# Patient Record
Sex: FEMALE | Race: BLACK OR AFRICAN AMERICAN | Employment: FULL TIME | URBAN - METROPOLITAN AREA
[De-identification: names, ages, dates, MRNs, and addresses within clinical notes are randomized per-mention and may not be internally consistent; named-entity substitution may affect disease eponyms.]

---

## 2017-01-14 RX ORDER — ATORVASTATIN CALCIUM 10 MG/1
TABLET, FILM COATED ORAL
Qty: 30 TAB | Refills: 1 | Status: SHIPPED | OUTPATIENT
Start: 2017-01-14 | End: 2017-04-18 | Stop reason: SDUPTHER

## 2017-01-14 NOTE — TELEPHONE ENCOUNTER
Please let pt know the rx they requested was sent to the pharmacy  And set up her follow up appt please I do not see one made

## 2017-01-16 ENCOUNTER — OFFICE VISIT (OUTPATIENT)
Dept: INTERNAL MEDICINE CLINIC | Age: 57
End: 2017-01-16

## 2017-01-16 VITALS
OXYGEN SATURATION: 92 % | TEMPERATURE: 100.4 F | RESPIRATION RATE: 14 BRPM | HEART RATE: 98 BPM | BODY MASS INDEX: 34.84 KG/M2 | DIASTOLIC BLOOD PRESSURE: 96 MMHG | SYSTOLIC BLOOD PRESSURE: 146 MMHG | WEIGHT: 216.8 LBS | HEIGHT: 66 IN

## 2017-01-16 DIAGNOSIS — R68.89 NOT FEELING GREAT: ICD-10-CM

## 2017-01-16 DIAGNOSIS — Z76.0 MEDICATION REFILL: ICD-10-CM

## 2017-01-16 DIAGNOSIS — R52 BODY ACHES: ICD-10-CM

## 2017-01-16 DIAGNOSIS — R05.8 NON-PRODUCTIVE COUGH: ICD-10-CM

## 2017-01-16 DIAGNOSIS — I10 ESSENTIAL HYPERTENSION: ICD-10-CM

## 2017-01-16 DIAGNOSIS — Z78.9 RECENT FOREIGN TRAVEL: ICD-10-CM

## 2017-01-16 DIAGNOSIS — R50.9 FEVER AND CHILLS: Primary | ICD-10-CM

## 2017-01-16 LAB
QUICKVUE INFLUENZA TEST: NEGATIVE
VALID INTERNAL CONTROL?: YES

## 2017-01-16 RX ORDER — OSELTAMIVIR PHOSPHATE 75 MG/1
75 CAPSULE ORAL 2 TIMES DAILY
Qty: 10 CAP | Refills: 0 | Status: SHIPPED | OUTPATIENT
Start: 2017-01-16 | End: 2017-01-21

## 2017-01-16 RX ORDER — ALBUTEROL SULFATE 0.83 MG/ML
2.5 SOLUTION RESPIRATORY (INHALATION) ONCE
Qty: 1 EACH | Refills: 0
Start: 2017-01-16 | End: 2017-01-16

## 2017-01-16 RX ORDER — ALBUTEROL SULFATE 90 UG/1
1 AEROSOL, METERED RESPIRATORY (INHALATION)
Qty: 1 INHALER | Refills: 3 | Status: SHIPPED | OUTPATIENT
Start: 2017-01-16 | End: 2019-04-23 | Stop reason: SDUPTHER

## 2017-01-16 RX ORDER — CODEINE PHOSPHATE AND GUAIFENESIN 10; 100 MG/5ML; MG/5ML
5 SOLUTION ORAL
Qty: 180 ML | Refills: 0 | Status: SHIPPED | OUTPATIENT
Start: 2017-01-16 | End: 2017-05-05 | Stop reason: ALTCHOICE

## 2017-01-16 RX ORDER — AZITHROMYCIN 250 MG/1
250 TABLET, FILM COATED ORAL SEE ADMIN INSTRUCTIONS
Qty: 6 TAB | Refills: 0 | Status: SHIPPED | OUTPATIENT
Start: 2017-01-16 | End: 2017-01-21

## 2017-01-16 RX ORDER — ALBUTEROL SULFATE 0.83 MG/ML
SOLUTION RESPIRATORY (INHALATION)
Qty: 1 PACKAGE | Refills: 0 | Status: SHIPPED | OUTPATIENT
Start: 2017-01-16 | End: 2019-04-23 | Stop reason: SDUPTHER

## 2017-01-16 NOTE — PROGRESS NOTES
1. Have you been to the ER, urgent care clinic since your last visit? Hospitalized since your last visit? No    2. Have you seen or consulted any other health care providers outside of the [address] [place] [address] since your last visit? Include any pap smears or colon screening. No    Chief Complaint   Patient presents with    Cold Symptoms     Has had a dry cough since yesterday, has headache, body aches, facial pain.       fasting

## 2017-01-16 NOTE — MR AVS SNAPSHOT
Visit Information Date & Time Provider Department Dept. Phone Encounter #  
 1/16/2017  8:30  [facility] [place] [address] [facility] [place] [id number] Follow-up Instructions Return in about 1 week (around 1/23/2017) for follow up influenza 15 min . Upcoming Health Maintenance Date Due Hepatitis C Screening 1960 BREAST CANCER SCRN MAMMOGRAM 4/16/2018 PAP AKA CERVICAL CYTOLOGY 9/16/2019 DTaP/Tdap/Td series (2 - Td) 12/21/2025 COLONOSCOPY 3/8/2026 Allergies as of 1/16/2017  Review Complete On: 1/16/2017 By: Tricia Melgar LPN Severity Noted Reaction Type Reactions Duragesic [Fentanyl]  06/07/2013    Nausea and Vomiting Current Immunizations  Reviewed on 11/16/2016 Name Date Tdap 12/21/2015 Not reviewed this visit You Were Diagnosed With   
  
 Codes Comments Fever and chills    -  Primary ICD-10-CM: R50.9 ICD-9-CM: 780.60 Body aches     ICD-10-CM: R52 ICD-9-CM: 780.96 Not feeling great     ICD-10-CM: R68.89 ICD-9-CM: 780.99 Recent foreign travel     ICD-10-CM: Z78.9 ICD-9-CM: V49.89 Essential hypertension     ICD-10-CM: I10 
ICD-9-CM: 401.9 Medication refill     ICD-10-CM: Z76.0 ICD-9-CM: V68.1 Non-productive cough     ICD-10-CM: R05 ICD-9-CM: 897. 2 Vitals BP Pulse Temp Resp Height(growth percentile) Weight(growth percentile) (!) 146/96 (BP 1 Location: Left arm, BP Patient Position: At rest) 98 100.4 °F (38 °C) (Oral) 14 5' 6\" (1.676 m) 216 lb 12.8 oz (98.3 kg) SpO2 PF BMI OB Status Smoking Status 92% 290 L/min 34.99 kg/m2 Hysterectomy Never Smoker Vitals History BMI and BSA Data Body Mass Index Body Surface Area 34.99 kg/m 2 2.14 m 2 Preferred Pharmacy Pharmacy Name Phone [id number] [place], [place] - [address] [phone] Your Updated Medication List  
  
   
This list is accurate as of: 1/16/17 10:07 AM.  Always use your most recent med list.  
  
  
  
  
 * albuterol 2.5 mg /3 mL (0.083 %) nebulizer solution Commonly known as:  PROVENTIL VENTOLIN  
1 vial Q 4-6 hrs as needed for cough * albuterol 90 mcg/actuation inhaler Commonly known as:  PROVENTIL HFA, VENTOLIN HFA, PROAIR HFA Take 1 Puff by inhalation every six (6) hours as needed for Wheezing or Shortness of Breath (cough). * albuterol 2.5 mg /3 mL (0.083 %) nebulizer solution Commonly known as:  PROVENTIL VENTOLIN  
3 mL by Nebulization route once for 1 dose. atorvastatin 10 mg tablet Commonly known as:  LIPITOR  
TAKE 1 TAB BY MOUTH DAILY. azithromycin 250 mg tablet Commonly known as:  Grainger Sleight Take 1 Tab by mouth See Admin Instructions for 5 days. cyclobenzaprine 5 mg tablet Commonly known as:  FLEXERIL Take 1 Tab by mouth nightly as needed for Muscle Spasm(s). diclofenac EC 75 mg EC tablet Commonly known as:  VOLTAREN Take  by mouth.  
  
 guaiFENesin-codeine 100-10 mg/5 mL solution Commonly known as:  ROBITUSSIN AC Take 5 mL by mouth four (4) times daily as needed for Cough. Max Daily Amount: 20 mL.  
  
 hydroxychloroquine 200 mg tablet Commonly known as:  PLAQUENIL Take 200 mg by mouth daily. ipratropium 0.06 % nasal spray Commonly known as:  ATROVENT  
2 Sprays by Both Nostrils route four (4) times daily. ketotifen 0.025 % (0.035 %) ophthalmic solution Commonly known as:  ZADITOR Administer 1 Drop to both eyes two (2) times a day. levothyroxine 112 mcg tablet Commonly known as:  SYNTHROID Take 1 Tab by mouth Daily (before breakfast). SYNTHROID brand name medically necessary  
  
 loratadine 10 mg tablet Commonly known as:  CLARITIN  
TAKE 1 TAB BY MOUTH DAILY. MOBIC PO Take  by mouth as needed. montelukast 10 mg tablet Commonly known as:  SINGULAIR  
 Take 1 Tab by mouth daily. oseltamivir 75 mg capsule Commonly known as:  TAMIFLU Take 1 Cap by mouth two (2) times a day for 5 days. Peak Flow Meter Yuridia Use as directed  
  
 triamterene-hydroCHLOROthiazide 37.5-25 mg per capsule Commonly known as:  Erika [place] Take 1 Cap by mouth daily. ZYRTEC PO Take  by mouth as needed. * Notice: This list has 3 medication(s) that are the same as other medications prescribed for you. Read the directions carefully, and ask your doctor or other care provider to review them with you. Prescriptions Printed Refills  
 guaiFENesin-codeine (ROBITUSSIN AC) 100-10 mg/5 mL solution 0 Sig: Take 5 mL by mouth four (4) times daily as needed for Cough. Max Daily Amount: 20 mL. Class: Print Route: Oral  
  
Prescriptions Sent to Pharmacy Refills  
 albuterol (PROVENTIL VENTOLIN) 2.5 mg /3 mL (0.083 %) nebulizer solution 0 Si vial Q 4-6 hrs as needed for cough Class: Normal  
 Pharmacy: [facility]/pharmacy [address] [place] [address] [address] Ph #: [phone]  
 albuterol (PROVENTIL HFA, VENTOLIN HFA, PROAIR HFA) 90 mcg/actuation inhaler 3 Sig: Take 1 Puff by inhalation every six (6) hours as needed for Wheezing or Shortness of Breath (cough). Class: Normal  
 Pharmacy: [facility]/pharmacy [address] [place] [address] [address] Ph #: [phone] Route: Inhalation  
 azithromycin (ZITHROMAX) 250 mg tablet 0 Sig: Take 1 Tab by mouth See Admin Instructions for 5 days. Class: Normal  
 Pharmacy: [facility]/pharmacy [address] [place] [address] [address] Ph #: [phone] Route: Oral  
 Peak Flow Meter negrita 0 Sig: Use as directed Class: Normal  
 Pharmacy: [facility]/pharmacy [address] [place] [address] [address]  [address] Ph #: [phone]  
 oseltamivir (TAMIFLU) 75 mg capsule 0  
 Sig: Take 1 Cap by mouth two (2) times a day for 5 days. Class: Normal  
 [facility] [id number] [address] [facility] [address]  #: [phone] Route: Oral  
  
We Performed the Following ALBUTEROL, INHAL. SOL., FDA-APPROVED FINAL, NON-COMPOUND UNIT DOSE, 1 MG [ HCPCS] AMB POC RAPID INFLUENZA TEST [44376 CPT(R)] B PERTUSSIS AB, IGG, IGM, IGA [id number] CPT(R)] CBC WITH AUTOMATED DIFF [98586 CPT(R)] INFLUENZA A, H1N1 BY PCR [[id number] Custom] LEGIONELLA PNEUMOPHILA AG, URINE  R577184 CPT(R)] Follow-up Instructions Return in about 1 week (around 1/23/2017) for follow up influenza 15 min . To-Do List   
 01/16/2017 Lab:  INFLUENZA A, H1N1 BY PCR   
  
 01/16/2017 Procedures:  INHAL RX, AIRWAY OBST/DX SPUTUM INDUCT Patient Instructions Please rest get plenty of fluids Introducing [facility] & HEALTH SERVICES! Dear Ivone Garcia: Thank you for requesting a [employer] account. Our records indicate that you already have an active [employer] account. You can access your account anytime at [email or web address] Did you know that you can access your hospital and ER discharge instructions at any time in [employer]Vita? You can also review all of your test results from your hospital stay or ER visit. Additional Information If you have questions, please visit the Frequently Asked Questions section of the [employer] website at [email or web address] Remember, [employer]ita is NOT to be used for urgent needs. For medical emergencies, dial 911. Now available from your iPhone and Android! Please provide this summary of care documentation to your next provider. Your primary care clinician is listed as Beti Thomas. If you have any questions after today's visit, please call [phone].

## 2017-01-16 NOTE — PROGRESS NOTES
Chief Complaint   Patient presents with    Cold Symptoms     Has had a dry cough since yesterday, has headache, body aches, facial pain. Subjective:   Nimisha Ragsdale 49 y.o.  female with a  past medical history reviewed see below. pt walked in for an appt nfw   Dry cough and nfw recent travel to [facility] and now has a ha from the cough and her body hurts as well she did not have her flu shot. ROS: otherwise feeling generally well. All other systems reviewed and are negative      Current Outpatient Prescriptions   Medication Sig Dispense Refill    atorvastatin (LIPITOR) 10 mg tablet TAKE 1 TAB BY MOUTH DAILY. 30 Tab 1    CETIRIZINE HCL (ZYRTEC PO) Take  by mouth as needed.  MELOXICAM (MOBIC PO) Take  by mouth as needed.  ketotifen (ZADITOR) 0.025 % (0.035 %) ophthalmic solution Administer 1 Drop to both eyes two (2) times a day.  triamterene-hydrochlorothiazide (DYAZIDE) 37.5-25 mg per capsule Take 1 Cap by mouth daily. 90 Cap 3    levothyroxine (SYNTHROID) 112 mcg tablet Take 1 Tab by mouth Daily (before breakfast). SYNTHROID brand name medically necessary 90 Tab 3    montelukast (SINGULAIR) 10 mg tablet Take 1 Tab by mouth daily. 30 Tab 0    diclofenac EC (VOLTAREN) 75 mg EC tablet Take  by mouth.  ipratropium (ATROVENT) 0.06 % nasal spray 2 Sprays by Both Nostrils route four (4) times daily. 15 mL 11    albuterol (PROVENTIL VENTOLIN) 2.5 mg /3 mL (0.083 %) nebulizer solution USE 3 ML VIA NEBULIZER AS DIRECTED 1 Package 0    albuterol (PROVENTIL HFA, VENTOLIN HFA, PROAIR HFA) 90 mcg/actuation inhaler Take 1 Puff by inhalation every six (6) hours as needed for Wheezing or Shortness of Breath (cough). 1 Inhaler 3    cyclobenzaprine (FLEXERIL) 5 mg tablet Take 1 Tab by mouth nightly as needed for Muscle Spasm(s). 90 Tab 1    hydroxychloroquine (PLAQUINIL) 200 mg tablet Take 200 mg by mouth daily.  loratadine (CLARITIN) 10 mg tablet TAKE 1 TAB BY MOUTH DAILY.  90 Tab 3     Allergies   Allergen Reactions    Duragesic [Fentanyl] Nausea and Vomiting     Past Medical History   Diagnosis Date    Arthritis     Asthma     H/O seasonal allergies 5/7/2013    Headache     Hypertension     Lupus ([address]     Other ill-defined conditions(799.89)      Lupus    Thyroid disease      Past Surgical History   Procedure Laterality Date    Hx gyn       Hysterectomy-partial    Hx gyn       C Section X1    Hx orthopaedic       left shoulder surgery, right hand surgery 12/9/2016     Family History   Problem Relation Age of Onset    Hypertension Mother     Heart Disease Mother     Stroke Mother     Hypertension Sister     Heart Disease Sister     Stroke Sister     Heart Disease Brother     Stroke Brother     Hypertension Maternal Grandmother     Heart Disease Brother     Diabetes Sister     Diabetes Sister     Diabetes Brother     Kidney Disease Sister     Diabetes Sister     Hypertension Sister     No Known Problems Daughter     No Known Problems Daughter      Social History   Substance Use Topics    Smoking status: Never Smoker    Smokeless tobacco: Never Used    Alcohol use No          Objective:     Visit Vitals    BP (!) 146/96 (BP 1 Location: Left arm, BP Patient Position: At rest)    Pulse 98    Temp 100.4 °F (38 °C) (Oral)    Resp 14    Ht 5' 6\" (1.676 m)    Wt 216 lb 12.8 oz (98.3 kg)    SpO2 92%    BMI 34.99 kg/m2     Gen: NAD, pleasant  HEENT: normal appearing head, nares patent, PERRLA, EOMI, oropharynx no erythema, no cervical lymphadenopathy neck supple   Cardio: RRR nl S1S2 no murmur  Lungs soft coarse bs and wheeze dry cough   ABD Soft non tender non distended + bowel sounds  Extremities: full ROM X 4 no clubbing no cyanosis  Neuro: no gross focal deficits noted, alert and orientated X 3  Psych.: well groomed no outward signs of depression. Assessment/Plan:   Nimisha was seen today for cold symptoms.     Diagnoses and all orders for this visit:    Fever and chills  -     CBC WITH AUTOMATED DIFF    Body aches  -     INFLUENZA A, H1N1 BY PCR; Future  -     INFLUENZA A, H1N1 BY PCR    Not feeling great  -     AMB POC RAPID INFLUENZA TEST  -     INFLUENZA A, H1N1 BY PCR; Future  -     INFLUENZA A, H1N1 BY PCR    Recent foreign travel  -     B PERTUSSIS AB, IGG, IGM, IGA  -     CBC WITH AUTOMATED DIFF  -     INFLUENZA A, H1N1 BY PCR; Future  -     INFLUENZA A, H1N1 BY PCR    Essential hypertension    Medication refill  -     albuterol (PROVENTIL HFA, VENTOLIN HFA, PROAIR HFA) 90 mcg/actuation inhaler; Take 1 Puff by inhalation every six (6) hours as needed for Wheezing or Shortness of Breath (cough). Non-productive cough  -     albuterol (PROVENTIL VENTOLIN) 2.5 mg /3 mL (0.083 %) nebulizer solution; 3 mL by Nebulization route once for 1 dose. -     ALBUTEROL, INHAL. XVJ.()  -     INHAL RX, AIRWAY OBST/DX SPUTUM INDUCT ([id number]); Future  -     B PERTUSSIS AB, IGG, IGM, IGA  -     CBC WITH AUTOMATED DIFF  -     LEGIONELLA PNEUMOPHILA AG, URINE     Other orders  -     albuterol (PROVENTIL VENTOLIN) 2.5 mg /3 mL (0.083 %) nebulizer solution; 1 vial Q 4-6 hrs as needed for cough  -     azithromycin (ZITHROMAX) 250 mg tablet; Take 1 Tab by mouth See Admin Instructions for 5 days.  -     guaiFENesin-codeine (ROBITUSSIN AC) 100-10 mg/5 mL solution; Take 5 mL by mouth four (4) times daily as needed for Cough. Max Daily Amount: 20 mL. -     Peak Flow Meter negrita; Use as directed  -     oseltamivir (TAMIFLU) 75 mg capsule; Take 1 Cap by mouth two (2) times a day for 5 days. Follow-up Disposition:  Return in about 1 week (around 1/23/2017) for follow up influenza 15 min . Fabricio alejandre printed and given to the pt. .    The patient voiced understanding of the above. Medication side effects were reviewed with the patient. Call with any concerns.

## 2017-01-18 LAB
B PERT IGA SER QL IA: 2.1 INDEX (ref 0–0.9)
B PERT IGG SER-ACNC: 3.42 INDEX (ref 0–0.94)
B PERT IGM SER QL IA: <1 INDEX (ref 0–0.9)
BASOPHILS # BLD AUTO: 0 X10E3/UL (ref 0–0.2)
BASOPHILS NFR BLD AUTO: 0 %
EOSINOPHIL # BLD AUTO: 0 X10E3/UL (ref 0–0.4)
EOSINOPHIL NFR BLD AUTO: 0 %
ERYTHROCYTE [DISTWIDTH] IN BLOOD BY AUTOMATED COUNT: 14 % (ref 12.3–15.4)
FLUAV H1 2009 PAND RNA SPEC QL NAA+PROBE: NEGATIVE
FLUAV RNA SPEC QL NAA+PROBE: NEGATIVE
HCT VFR BLD AUTO: 37.2 % (ref 34–46.6)
HGB BLD-MCNC: 12.2 G/DL (ref 11.1–15.9)
IMM GRANULOCYTES # BLD: 0 X10E3/UL (ref 0–0.1)
IMM GRANULOCYTES NFR BLD: 0 %
LYMPHOCYTES # BLD AUTO: 0.6 X10E3/UL (ref 0.7–3.1)
LYMPHOCYTES NFR BLD AUTO: 14 %
MCH RBC QN AUTO: 29.5 PG (ref 26.6–33)
MCHC RBC AUTO-ENTMCNC: 32.8 G/DL (ref 31.5–35.7)
MCV RBC AUTO: 90 FL (ref 79–97)
MONOCYTES # BLD AUTO: 0.5 X10E3/UL (ref 0.1–0.9)
MONOCYTES NFR BLD AUTO: 12 %
NEUTROPHILS # BLD AUTO: 3.3 X10E3/UL (ref 1.4–7)
NEUTROPHILS NFR BLD AUTO: 74 %
PLATELET # BLD AUTO: 251 X10E3/UL (ref 150–379)
RBC # BLD AUTO: 4.14 X10E6/UL (ref 3.77–5.28)
WBC # BLD AUTO: 4.5 X10E3/UL (ref 3.4–10.8)

## 2017-01-19 LAB — L PNEUMO1 AG UR QL IA: NEGATIVE

## 2017-01-23 ENCOUNTER — OFFICE VISIT (OUTPATIENT)
Dept: INTERNAL MEDICINE CLINIC | Age: 57
End: 2017-01-23

## 2017-01-23 VITALS
WEIGHT: 216.8 LBS | RESPIRATION RATE: 14 BRPM | SYSTOLIC BLOOD PRESSURE: 137 MMHG | HEART RATE: 87 BPM | BODY MASS INDEX: 34.84 KG/M2 | HEIGHT: 66 IN | DIASTOLIC BLOOD PRESSURE: 72 MMHG | TEMPERATURE: 97.4 F

## 2017-01-23 DIAGNOSIS — A37.90 PERTUSSIS: Primary | ICD-10-CM

## 2017-01-23 DIAGNOSIS — A37.90 PERTUSSIS: ICD-10-CM

## 2017-01-23 DIAGNOSIS — Z71.2 ENCOUNTER TO DISCUSS TEST RESULTS: ICD-10-CM

## 2017-01-23 NOTE — MR AVS SNAPSHOT
Visit Information Date & Time Provider Department Dept. Phone Encounter #  
 1/23/2017  1:30 PM [facility], [address] [facility] [phone] [id number] Follow-up Instructions Return if symptoms worsen or fail to improve. Upcoming Health Maintenance Date Due Hepatitis C Screening 1960 BREAST CANCER SCRN MAMMOGRAM 4/16/2018 PAP AKA CERVICAL CYTOLOGY 9/16/2019 DTaP/Tdap/Td series (2 - Td) 12/21/2025 COLONOSCOPY 3/8/2026 Allergies as of 1/23/2017  Review Complete On: 1/23/2017 By: Lakia Moses LPN Severity Noted Reaction Type Reactions Duragesic [Fentanyl]  06/07/2013    Nausea and Vomiting Current Immunizations  Reviewed on 1/23/2017 Name Date Tdap 12/21/2015 Reviewed by [facility], [place] on 1/23/2017 at  1:52 PM  
You Were Diagnosed With   
  
 Codes Comments Pertussis    -  Primary ICD-10-CM: A37.90 ICD-9-CM: 033.9 Encounter to discuss test results     ICD-10-CM: Z71.89 ICD-9-CM: V65.49 Vitals BP Pulse Temp Resp Height(growth percentile) Weight(growth percentile)  
 137/72 (BP 1 Location: Left arm, BP Patient Position: At rest) 87 97.4 °F (36.3 °C) (Oral) 14 5' 6\" (1.676 m) 216 lb 12.8 oz (98.3 kg) BMI OB Status Smoking Status 34.99 kg/m2 Hysterectomy Never Smoker Vitals History BMI and BSA Data Body Mass Index Body Surface Area 34.99 kg/m 2 2.14 m 2 Preferred Pharmacy Pharmacy Name Phone [id number], [place] - [address] [phone] Your Updated Medication List  
  
   
This list is accurate as of: 1/23/17  1:53 PM.  Always use your most recent med list.  
  
  
  
  
 * albuterol 2.5 mg /3 mL (0.083 %) nebulizer solution Commonly known as:  PROVENTIL VENTOLIN  
1 vial Q 4-6 hrs as needed for cough * albuterol 90 mcg/actuation inhaler Commonly known as:  PROVENTIL HFA, VENTOLIN HFA, PROAIR HFA Take 1 Puff by inhalation every six (6) hours as needed for Wheezing or Shortness of Breath (cough). atorvastatin 10 mg tablet Commonly known as:  LIPITOR  
TAKE 1 TAB BY MOUTH DAILY. cyclobenzaprine 5 mg tablet Commonly known as:  FLEXERIL Take 1 Tab by mouth nightly as needed for Muscle Spasm(s). diclofenac EC 75 mg EC tablet Commonly known as:  VOLTAREN Take  by mouth.  
  
 guaiFENesin-codeine 100-10 mg/5 mL solution Commonly known as:  ROBITUSSIN AC Take 5 mL by mouth four (4) times daily as needed for Cough. Max Daily Amount: 20 mL.  
  
 hydroxychloroquine 200 mg tablet Commonly known as:  PLAQUENIL Take 200 mg by mouth daily. ipratropium 0.06 % nasal spray Commonly known as:  ATROVENT  
2 Sprays by Both Nostrils route four (4) times daily. ketotifen 0.025 % (0.035 %) ophthalmic solution Commonly known as:  ZADITOR Administer 1 Drop to both eyes two (2) times a day. levothyroxine 112 mcg tablet Commonly known as:  SYNTHROID Take 1 Tab by mouth Daily (before breakfast). SYNTHROID brand name medically necessary  
  
 loratadine 10 mg tablet Commonly known as:  CLARITIN  
TAKE 1 TAB BY MOUTH DAILY. MOBIC PO Take  by mouth as needed. montelukast 10 mg tablet Commonly known as:  SINGULAIR Take 1 Tab by mouth daily. Peak Flow Meter Indu Hawk Use as directed  
  
 triamterene-hydroCHLOROthiazide 37.5-25 mg per capsule Commonly known as:  Xiomara Olszewski Take 1 Cap by mouth daily. ZYRTEC PO Take  by mouth as needed. * Notice: This list has 2 medication(s) that are the same as other medications prescribed for you. Read the directions carefully, and ask your doctor or other care provider to review them with you. Follow-up Instructions Return if symptoms worsen or fail to improve. To-Do List   
 01/23/2017 Lab:  B PERTUSSIS AB, IGG, IGM, IGA Patient Instructions Whooping Cough (Pertussis): Care Instructions Your Care Instructions Pertussis (also called whooping cough) is a respiratory infection. You may be sneezing and coughing and have a runny nose and fever. What makes pertussis different from other illnesses with these symptoms is that the cough takes a long time to go away. The other symptoms will gradually go away, but the cough may get worse and last a long time. You may have a cough for weeks or even months. A coughing spell may last for a minute or more. In between coughing spells, you may feel very tired. Your doctor may give you antibiotics to control the spread of the bacteria to other people. But even with the antibiotics, you may keep coughing. Whooping cough can spread quickly from person to person. Other family members may need to be immunized to prevent the spread of the disease. You can prevent or decrease the severity of whooping cough in your family by keeping your family's immunizations up to date. Follow-up care is a key part of your treatment and safety. Be sure to make and go to all appointments, and call your doctor if you are having problems. It's also a good idea to know your test results and keep a list of the medicines you take. How can you care for yourself at home? · Take your medicines exactly as prescribed. Call your doctor if you think you are having a problem with your medicine. You will get more details on the specific medicines your doctor prescribes. · If your doctor prescribed antibiotics, take them as directed. Do not stop taking them just because you feel better. You need to take the full course of antibiotics. · Avoid contact with smoke and dust. 
· Keep away from other people, especially children, while you are ill. · Wash your hands often to help prevent the spread of infection. When should you call for help? Call 911 anytime you think you may need emergency care. For example, call if: 
· You have severe trouble breathing. Call your doctor now or seek immediate medical care if: 
· You have new or worse trouble breathing. · You cough up dark brown or bloody mucus (sputum). · You have a new or higher fever. Watch closely for changes in your health, and be sure to contact your doctor if: 
· You do not get better after 2 weeks. · Your cough gets worse. Where can you learn more? Go to http://eris-lalito.info/. Enter D400 in the search box to learn more about \"Whooping Cough (Pertussis): Care Instructions. \" Current as of: July 26, 2016 Content Version: 11.1 © 7885-8395 [employer]. Care instructions adapted under license by ip.access (which disclaims liability or warranty for this information). If you have questions about a medical condition or this instruction, always ask your healthcare professional. [place] [address] any warranty or liability for your use of this information. Introducing [facility] & HEALTH SERVICES! Dear Vadim Brennan: Thank you for requesting a [employer] account. Our records indicate that you already have an active [employer] account. You can access your account anytime at [email or web address]. [email or web address]/[email or web address] Did you know that you can access your hospital and ER discharge instructions at any time in [employer]? You can also review all of your test results from your hospital stay or ER visit. Additional Information If you have questions, please visit the Frequently Asked Questions section of the [employer] website at [email or web address] Remember, [employer] is NOT to be used for urgent needs. For medical emergencies, dial 911. Now available from your iPhone and Android! Please provide this summary of care documentation to your next provider. Your primary care clinician is listed as Amber Guillen. If you have any questions after today's visit, please call [phone].

## 2017-01-23 NOTE — PROGRESS NOTES
1. Have you been to the ER, urgent care clinic since your last visit? Hospitalized since your last visit? No    2. Have you seen or consulted any other health care providers outside of the [address] [place] [address] since your last visit? Include any pap smears or colon screening.  No     Chief Complaint   Patient presents with    Cough     1 week follow up     Not fasting

## 2017-01-23 NOTE — PROGRESS NOTES
Chief Complaint   Patient presents with    Cough     1 week follow up           Subjective:   Nimisha Ragsdale 49 y.o.  female with a  past medical history reviewed see below. comes in today   Here for f/up took all the medicationas directed and with in 24 hrs fo taking the rx she felt better   However  is coughing now   ROS: otherwise feeling generally well. All other systems reviewed and are negative      Current Outpatient Prescriptions   Medication Sig Dispense Refill    albuterol (PROVENTIL VENTOLIN) 2.5 mg /3 mL (0.083 %) nebulizer solution 1 vial Q 4-6 hrs as needed for cough 1 Package 0    albuterol (PROVENTIL HFA, VENTOLIN HFA, PROAIR HFA) 90 mcg/actuation inhaler Take 1 Puff by inhalation every six (6) hours as needed for Wheezing or Shortness of Breath (cough). 1 Inhaler 3    atorvastatin (LIPITOR) 10 mg tablet TAKE 1 TAB BY MOUTH DAILY. 30 Tab 1    CETIRIZINE HCL (ZYRTEC PO) Take  by mouth as needed.  MELOXICAM (MOBIC PO) Take  by mouth as needed.  ketotifen (ZADITOR) 0.025 % (0.035 %) ophthalmic solution Administer 1 Drop to both eyes two (2) times a day.  levothyroxine (SYNTHROID) 112 mcg tablet Take 1 Tab by mouth Daily (before breakfast). SYNTHROID brand name medically necessary 90 Tab 3    montelukast (SINGULAIR) 10 mg tablet Take 1 Tab by mouth daily. 30 Tab 0    diclofenac EC (VOLTAREN) 75 mg EC tablet Take  by mouth.  loratadine (CLARITIN) 10 mg tablet TAKE 1 TAB BY MOUTH DAILY. 90 Tab 3    ipratropium (ATROVENT) 0.06 % nasal spray 2 Sprays by Both Nostrils route four (4) times daily. 15 mL 11    cyclobenzaprine (FLEXERIL) 5 mg tablet Take 1 Tab by mouth nightly as needed for Muscle Spasm(s). 90 Tab 1    hydroxychloroquine (PLAQUINIL) 200 mg tablet Take 200 mg by mouth daily.  guaiFENesin-codeine (ROBITUSSIN AC) 100-10 mg/5 mL solution Take 5 mL by mouth four (4) times daily as needed for Cough. Max Daily Amount: 20 mL.  180 mL 0    Peak Flow Meter negrita Use as directed 1 Device 0    triamterene-hydrochlorothiazide (DYAZIDE) 37.5-25 mg per capsule Take 1 Cap by mouth daily.  90 Cap 3     Allergies   Allergen Reactions    Duragesic [Fentanyl] Nausea and Vomiting     Past Medical History   Diagnosis Date    Arthritis     Asthma     H/O seasonal allergies 5/7/2013    Headache     Hypertension     Lupus (HCC)     Other ill-defined conditions(799.89)      Lupus    Thyroid disease      Past Surgical History   Procedure Laterality Date    Hx gyn       Hysterectomy-partial    Hx gyn       C Section X1    Hx orthopaedic       left shoulder surgery, right hand surgery 12/9/2016     Family History   Problem Relation Age of Onset    Hypertension Mother     Heart Disease Mother     Stroke Mother     Hypertension Sister     Heart Disease Sister     Stroke Sister     Heart Disease Brother     Stroke Brother     Hypertension Maternal Grandmother     Heart Disease Brother     Diabetes Sister     Diabetes Sister     Diabetes Brother     Kidney Disease Sister     Diabetes Sister     Hypertension Sister     No Known Problems Daughter     No Known Problems Daughter      Social History   Substance Use Topics    Smoking status: Never Smoker    Smokeless tobacco: Never Used    Alcohol use No          Objective:     Visit Vitals    /72 (BP 1 Location: Left arm, BP Patient Position: At rest)    Pulse 87    Temp 97.4 °F (36.3 °C) (Oral)    Resp 14    Ht 5' 6\" (1.676 m)    Wt 216 lb 12.8 oz (98.3 kg)    BMI 34.99 kg/m2     Gen: NAD, pleasant  HEENT: normal appearing head, nares patent, PERRLA, EOMI, oropharynx no erythema, no cervical lymphadenopathy neck supple   Cardio: RRR nl S1S2 no murmur  Lungs still coughing mild wheeze no rales no rhonchi  ABD Soft non tender non distended + bowel sounds  Extremities: full ROM X 4 no clubbing no cyanosis  Neuro: no gross focal deficits noted, alert and orientated X 3    Assessment/Plan:   Nimisha costa seen today for cough. Diagnoses and all orders for this visit:    Pertussis  -     B PERTUSSIS AB, IGG, IGM, IGA; Future    Encounter to discuss test results      Follow-up Disposition:  Return if symptoms worsen or fail to improve, for lab only in 3 weeks . .  avs printed and given to the pt. .  Return in 3 weeks for recheck of labs no need to see pt unless having sx's may need additional course of abx if he is coughing The patient voiced understanding of the above. Medication side effects were reviewed with the patient. Call with any concerns.

## 2017-04-18 LAB — CREATININE, EXTERNAL: 1

## 2017-04-21 ENCOUNTER — APPOINTMENT (OUTPATIENT)
Dept: GENERAL RADIOLOGY | Age: 57
End: 2017-04-21
Attending: EMERGENCY MEDICINE
Payer: COMMERCIAL

## 2017-04-21 ENCOUNTER — HOSPITAL ENCOUNTER (EMERGENCY)
Age: 57
Discharge: HOME | End: 2017-04-21
Attending: EMERGENCY MEDICINE
Payer: COMMERCIAL

## 2017-04-21 VITALS
SYSTOLIC BLOOD PRESSURE: 104 MMHG | HEART RATE: 101 BPM | RESPIRATION RATE: 14 BRPM | HEIGHT: 66 IN | DIASTOLIC BLOOD PRESSURE: 58 MMHG | TEMPERATURE: 98 F | BODY MASS INDEX: 35.4 KG/M2 | OXYGEN SATURATION: 100 % | WEIGHT: 220.24 LBS

## 2017-04-21 DIAGNOSIS — R21 RASH AND OTHER NONSPECIFIC SKIN ERUPTION: ICD-10-CM

## 2017-04-21 DIAGNOSIS — J20.9 ACUTE BRONCHITIS, UNSPECIFIED ORGANISM: Primary | ICD-10-CM

## 2017-04-21 LAB
ALBUMIN SERPL BCP-MCNC: 3.4 G/DL (ref 3.5–5)
ALBUMIN/GLOB SERPL: 0.7 {RATIO} (ref 1.1–2.2)
ALP SERPL-CCNC: 105 U/L (ref 45–117)
ALT SERPL-CCNC: 57 U/L (ref 12–78)
ANION GAP BLD CALC-SCNC: 7 MMOL/L (ref 5–15)
APPEARANCE UR: CLEAR
AST SERPL W P-5'-P-CCNC: 40 U/L (ref 15–37)
BACTERIA URNS QL MICRO: NEGATIVE /HPF
BASOPHILS # BLD AUTO: 0 K/UL (ref 0–0.1)
BASOPHILS # BLD: 0 % (ref 0–1)
BILIRUB SERPL-MCNC: 0.2 MG/DL (ref 0.2–1)
BILIRUB UR QL: NEGATIVE
BUN SERPL-MCNC: 17 MG/DL (ref 6–20)
BUN/CREAT SERPL: 17 (ref 12–20)
CALCIUM SERPL-MCNC: 8.8 MG/DL (ref 8.5–10.1)
CHLORIDE SERPL-SCNC: 105 MMOL/L (ref 97–108)
CK MB CFR SERPL CALC: 0.8 % (ref 0–2.5)
CK MB SERPL-MCNC: 1.4 NG/ML (ref 5–25)
CK SERPL-CCNC: 175 U/L (ref 26–192)
CO2 SERPL-SCNC: 29 MMOL/L (ref 21–32)
COLOR UR: ABNORMAL
CREAT SERPL-MCNC: 0.99 MG/DL (ref 0.55–1.02)
EOSINOPHIL # BLD: 0.1 K/UL (ref 0–0.4)
EOSINOPHIL NFR BLD: 1 % (ref 0–7)
EPITH CASTS URNS QL MICRO: ABNORMAL /LPF
ERYTHROCYTE [DISTWIDTH] IN BLOOD BY AUTOMATED COUNT: 13.8 % (ref 11.5–14.5)
GLOBULIN SER CALC-MCNC: 5.2 G/DL (ref 2–4)
GLUCOSE SERPL-MCNC: 105 MG/DL (ref 65–100)
GLUCOSE UR STRIP.AUTO-MCNC: NEGATIVE MG/DL
HCT VFR BLD AUTO: 35.1 % (ref 35–47)
HGB BLD-MCNC: 11.5 G/DL (ref 11.5–16)
HGB UR QL STRIP: NEGATIVE
HYALINE CASTS URNS QL MICRO: ABNORMAL /LPF (ref 0–5)
KETONES UR QL STRIP.AUTO: NEGATIVE MG/DL
LEUKOCYTE ESTERASE UR QL STRIP.AUTO: ABNORMAL
LYMPHOCYTES # BLD AUTO: 36 % (ref 12–49)
LYMPHOCYTES # BLD: 2.8 K/UL (ref 0.8–3.5)
MCH RBC QN AUTO: 29.5 PG (ref 26–34)
MCHC RBC AUTO-ENTMCNC: 32.8 G/DL (ref 30–36.5)
MCV RBC AUTO: 90 FL (ref 80–99)
MONOCYTES # BLD: 0.7 K/UL (ref 0–1)
MONOCYTES NFR BLD AUTO: 9 % (ref 5–13)
NEUTS SEG # BLD: 4.2 K/UL (ref 1.8–8)
NEUTS SEG NFR BLD AUTO: 54 % (ref 32–75)
NITRITE UR QL STRIP.AUTO: NEGATIVE
PH UR STRIP: 6 [PH] (ref 5–8)
PLATELET # BLD AUTO: 263 K/UL (ref 150–400)
POTASSIUM SERPL-SCNC: 3.5 MMOL/L (ref 3.5–5.1)
PROT SERPL-MCNC: 8.6 G/DL (ref 6.4–8.2)
PROT UR STRIP-MCNC: NEGATIVE MG/DL
RBC # BLD AUTO: 3.9 M/UL (ref 3.8–5.2)
RBC #/AREA URNS HPF: ABNORMAL /HPF (ref 0–5)
SODIUM SERPL-SCNC: 141 MMOL/L (ref 136–145)
SP GR UR REFRACTOMETRY: 1.02 (ref 1–1.03)
TROPONIN I SERPL-MCNC: <0.04 NG/ML
UROBILINOGEN UR QL STRIP.AUTO: 0.2 EU/DL (ref 0.2–1)
WBC # BLD AUTO: 7.9 K/UL (ref 3.6–11)
WBC URNS QL MICRO: ABNORMAL /HPF (ref 0–4)

## 2017-04-21 PROCEDURE — 80053 COMPREHEN METABOLIC PANEL: CPT | Performed by: EMERGENCY MEDICINE

## 2017-04-21 PROCEDURE — 93005 ELECTROCARDIOGRAM TRACING: CPT

## 2017-04-21 PROCEDURE — 71020 XR CHEST PA LAT: CPT

## 2017-04-21 PROCEDURE — 85025 COMPLETE CBC W/AUTO DIFF WBC: CPT | Performed by: EMERGENCY MEDICINE

## 2017-04-21 PROCEDURE — 81001 URINALYSIS AUTO W/SCOPE: CPT | Performed by: EMERGENCY MEDICINE

## 2017-04-21 PROCEDURE — 82550 ASSAY OF CK (CPK): CPT | Performed by: EMERGENCY MEDICINE

## 2017-04-21 PROCEDURE — 99283 EMERGENCY DEPT VISIT LOW MDM: CPT

## 2017-04-21 PROCEDURE — 74011000250 HC RX REV CODE- 250: Performed by: EMERGENCY MEDICINE

## 2017-04-21 PROCEDURE — 77030013140 HC MSK NEB VYRM -A

## 2017-04-21 PROCEDURE — 94640 AIRWAY INHALATION TREATMENT: CPT

## 2017-04-21 PROCEDURE — 84484 ASSAY OF TROPONIN QUANT: CPT | Performed by: EMERGENCY MEDICINE

## 2017-04-21 PROCEDURE — 36415 COLL VENOUS BLD VENIPUNCTURE: CPT | Performed by: EMERGENCY MEDICINE

## 2017-04-21 RX ORDER — IPRATROPIUM BROMIDE AND ALBUTEROL SULFATE 2.5; .5 MG/3ML; MG/3ML
3 SOLUTION RESPIRATORY (INHALATION) ONCE
Status: COMPLETED | OUTPATIENT
Start: 2017-04-21 | End: 2017-04-21

## 2017-04-21 RX ADMIN — IPRATROPIUM BROMIDE AND ALBUTEROL SULFATE 3 ML: .5; 3 SOLUTION RESPIRATORY (INHALATION) at 21:56

## 2017-04-22 LAB
ATRIAL RATE: 95 BPM
CALCULATED P AXIS, ECG09: 24 DEGREES
CALCULATED R AXIS, ECG10: 18 DEGREES
CALCULATED T AXIS, ECG11: 2 DEGREES
DIAGNOSIS, 93000: NORMAL
P-R INTERVAL, ECG05: 152 MS
Q-T INTERVAL, ECG07: 350 MS
QRS DURATION, ECG06: 76 MS
QTC CALCULATION (BEZET), ECG08: 439 MS
VENTRICULAR RATE, ECG03: 95 BPM

## 2017-04-22 NOTE — ED NOTES
The physician has reviewed discharge instructions with the patient. The patient verbalized understanding. Patient ambulated out of Emergency Department with a steady gait.

## 2017-04-22 NOTE — ED PROVIDER NOTES
HPI Comments: Pari Bennett is a 49 y.o. female who presents ambulatory to [id number] [place] [address] ED with cc of increased SOB for the past several days with associated nonproductive cough. Pt states that she initially thought that symptoms were due to seasonal allergies, but notes no relief from using nebulizer treatments, albuterol inhaler, or Zyrtec daily. She states that she was evaluated by PCP three days ago and diagnosed with acute bronchitis. Pt states that she has been compliant with prescribed Doxycycline and Prednisone, but denies any relief in symptoms from medication. Of note, pt is also currently c/o abnormal discoloration to bilateral ankles and lower leg. She states that symptoms began 4 days ago as a darker discoloration \"sort of like a sock like\" but denies any sun exposure or sock use recently. She notes that the area was also swollen earlier, but she has been elevating LEs and noted subsequent improvement in swelling. Pt denies any pain to the area and specifically denies any fever, chills, chest pain, nausea, or vomiting. Raffi Orourke MD  Allergist: Jessica Whaley MD    PMHx: HTN, asthma, Lupus, seasonal allergies  Social Hx: - smoking; - EtOH; - illicit drug use    There are no other complains, changes, or physical findings at this time. The history is provided by the patient. No  was used.         Past Medical History:   Diagnosis Date    Arthritis     Asthma     H/O seasonal allergies 5/7/2013    Headache     Hypertension     Lupus ([address]     Other ill-defined conditions(799.89)     Lupus    Thyroid disease        Past Surgical History:   Procedure Laterality Date    HX GYN      Hysterectomy-partial    HX GYN      C Section X1    HX ORTHOPAEDIC      left shoulder surgery, right hand surgery 12/9/2016         Family History:   Problem Relation Age of Onset    Hypertension Mother     Heart Disease Mother     Stroke Mother     Hypertension Sister     Heart Disease Sister     Stroke Sister     Heart Disease Brother     Stroke Brother     Hypertension Maternal Grandmother     Heart Disease Brother     Diabetes Sister     Diabetes Sister     Diabetes Brother     Kidney Disease Sister     Diabetes Sister     Hypertension Sister     No Known Problems Daughter     No Known Problems Daughter        Social History     Social History    Marital status:      Spouse name: ladonna garcia    Number of children: 2    Years of education: N/A     Occupational History     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      Social History Main Topics    Smoking status: Never Smoker    Smokeless tobacco: Never Used    Alcohol use No    Drug use: No      Comment: denies     Sexual activity: Yes     Partners: Male     Birth control/ protection: None     Other Topics Concern    Not on file     Social History Narrative         ALLERGIES: Duragesic [fentanyl]    Review of Systems   Constitutional: Negative for activity change, chills and fever. HENT: Negative for congestion and sore throat. Eyes: Negative for pain and redness. Respiratory: Positive for cough and shortness of breath. Negative for chest tightness. Cardiovascular: Negative for chest pain and palpitations. Gastrointestinal: Negative for abdominal pain, diarrhea, nausea and vomiting. Genitourinary: Negative for dysuria, frequency and urgency. Musculoskeletal: Negative for back pain and neck pain. Skin: Positive for color change. Negative for rash. Neurological: Negative for syncope, light-headedness and headaches. Psychiatric/Behavioral: Negative for confusion. All other systems reviewed and are negative. Vitals:    04/21/17 2000   BP: 168/90   Pulse: (!) 106   Resp: 16   Temp: 98.2 °F (36.8 °C)   SpO2: 100%   Weight: 99.9 kg (220 lb 3.8 oz)   Height: 5' 6\" (1.676 m)            Physical Exam   Constitutional: She is oriented to person, place, and time.  She appears well-developed and well-nourished. No distress. HENT:   Head: Normocephalic. Nose: Nose normal.   Mouth/Throat: Oropharynx is clear and moist. No oropharyngeal exudate. Eyes: Conjunctivae are normal. Pupils are equal, round, and reactive to light. No scleral icterus. Neck: Normal range of motion. Neck supple. No JVD present. No tracheal deviation present. No thyromegaly present. Cardiovascular: Normal rate, regular rhythm and intact distal pulses. Exam reveals no gallop and no friction rub. No murmur heard. Pulmonary/Chest: Effort normal and breath sounds normal. No stridor. No respiratory distress. She has no wheezes. She has no rales. Abdominal: Soft. Bowel sounds are normal. She exhibits no distension. There is no tenderness. There is no rebound and no guarding. Musculoskeletal: Normal range of motion. She exhibits no edema. Lymphadenopathy:     She has no cervical adenopathy. Neurological: She is alert and oriented to person, place, and time. No cranial nerve deficit. She exhibits normal muscle tone. Coordination normal.   Skin: Skin is warm and dry. No rash noted. She is not diaphoretic. No erythema. Well demarcated area of hyperpigmentation to bilateral feet and distal lower leg, sock like in appearance and equal on both legs. No papular lesions, skin intact. Psychiatric: She has a normal mood and affect. Her behavior is normal.   Nursing note and vitals reviewed.        MDM  Number of Diagnoses or Management Options  Acute bronchitis, unspecified organism:   Rash and other nonspecific skin eruption:   Diagnosis management comments: DDx: Acute bronchitis, sun related rash, adverse medication reaction, nonspecific rash, PNA, ACS       Amount and/or Complexity of Data Reviewed  Clinical lab tests: reviewed and ordered  Tests in the radiology section of CPT®: reviewed and ordered  Tests in the medicine section of CPT®: ordered and reviewed  Review and summarize past medical records: yes  Independent visualization of images, tracings, or specimens: yes    Risk of Complications, Morbidity, and/or Mortality  General comments: Pt well appearing; good room air sats; no increased wbc; negative troponin; already on doxy and steroid course; hyperpigementation of bilateral feet and distal lower legs in sock like distribution with linear well demarcated half circumferential line on lower legs at equal level on both legs;  appears sun related; no papular lesions; gave dermatology follow up; Judson Calderon MD      Patient Progress  Patient progress: stable    ED Course       Procedures    Progress Note:  9:42 PM  Of note, pt currently defers any steroids in the ED.     LABORATORY TESTS:  Recent Results (from the past 12 hour(s))   EKG, 12 LEAD, INITIAL    Collection Time: 04/21/17  8:03 PM   Result Value Ref Range    Ventricular Rate 95 BPM    Atrial Rate 95 BPM    P-R Interval 152 ms    QRS Duration 76 ms    Q-T Interval 350 ms    QTC Calculation (Bezet) 439 ms    Calculated P Axis 24 degrees    Calculated R Axis 18 degrees    Calculated T Axis 2 degrees    Diagnosis       Normal sinus rhythm  Minimal voltage criteria for LVH, may be normal variant  Cannot rule out Anterior infarct , age undetermined  No previous ECGs available     URINALYSIS W/MICROSCOPIC    Collection Time: 04/21/17  8:10 PM   Result Value Ref Range    Color YELLOW/STRAW      Appearance CLEAR CLEAR      Specific gravity 1.017 1.003 - 1.030      pH (UA) 6.0 5.0 - 8.0      Protein NEGATIVE  NEG mg/dL    Glucose NEGATIVE  NEG mg/dL    Ketone NEGATIVE  NEG mg/dL    Bilirubin NEGATIVE  NEG      Blood NEGATIVE  NEG      Urobilinogen 0.2 0.2 - 1.0 EU/dL    Nitrites NEGATIVE  NEG      Leukocyte Esterase SMALL (A) NEG      WBC 5-10 0 - 4 /hpf    RBC 0-5 0 - 5 /hpf    Epithelial cells FEW FEW /lpf    Bacteria NEGATIVE  NEG /hpf    Hyaline cast 0-2 0 - 5 /lpf   CBC WITH AUTOMATED DIFF    Collection Time: 04/21/17  8:58 PM   Result Value Ref Range    WBC 7.9 3.6 - 11.0 K/uL    RBC 3.90 3.80 - 5.20 M/uL    HGB 11.5 11.5 - 16.0 g/dL    HCT 35.1 35.0 - 47.0 %    MCV 90.0 80.0 - 99.0 FL    MCH 29.5 26.0 - 34.0 PG    MCHC 32.8 30.0 - 36.5 g/dL    RDW 13.8 11.5 - 14.5 %    PLATELET 733 042 - 983 K/uL    NEUTROPHILS 54 32 - 75 %    LYMPHOCYTES 36 12 - 49 %    MONOCYTES 9 5 - 13 %    EOSINOPHILS 1 0 - 7 %    BASOPHILS 0 0 - 1 %    ABS. NEUTROPHILS 4.2 1.8 - 8.0 K/UL    ABS. LYMPHOCYTES 2.8 0.8 - 3.5 K/UL    ABS. MONOCYTES 0.7 0.0 - 1.0 K/UL    ABS. EOSINOPHILS 0.1 0.0 - 0.4 K/UL    ABS. BASOPHILS 0.0 0.0 - 0.1 K/UL   METABOLIC PANEL, COMPREHENSIVE    Collection Time: 04/21/17  8:58 PM   Result Value Ref Range    Sodium 141 136 - 145 mmol/L    Potassium 3.5 3.5 - 5.1 mmol/L    Chloride 105 97 - 108 mmol/L    CO2 29 21 - 32 mmol/L    Anion gap 7 5 - 15 mmol/L    Glucose 105 (H) 65 - 100 mg/dL    BUN 17 6 - 20 MG/DL    Creatinine 0.99 0.55 - 1.02 MG/DL    BUN/Creatinine ratio 17 12 - 20      GFR est AA >60 >60 ml/min/1.73m2    GFR est non-AA 58 (L) >60 ml/min/1.73m2    Calcium 8.8 8.5 - 10.1 MG/DL    Bilirubin, total 0.2 0.2 - 1.0 MG/DL    ALT (SGPT) 57 12 - 78 U/L    AST (SGOT) 40 (H) 15 - 37 U/L    Alk. phosphatase 105 45 - 117 U/L    Protein, total 8.6 (H) 6.4 - 8.2 g/dL    Albumin 3.4 (L) 3.5 - 5.0 g/dL    Globulin 5.2 (H) 2.0 - 4.0 g/dL    A-G Ratio 0.7 (L) 1.1 - 2.2     CK W/ CKMB & INDEX    Collection Time: 04/21/17  8:58 PM   Result Value Ref Range     26 - 192 U/L    CK - MB 1.4 <3.6 NG/ML    CK-MB Index 0.8 0 - 2.5     TROPONIN I    Collection Time: 04/21/17  8:58 PM   Result Value Ref Range    Troponin-I, Qt. <0.04 <0.05 ng/mL       IMAGING RESULTS:  CXR Results  (Last 48 hours)               04/21/17 2103  XR CHEST PA LAT Final result    Impression:  IMPRESSION: No acute findings. Narrative:  EXAM:  XR CHEST PA LAT       INDICATION:   Chest Pain and SOB for the past several days with associated   nonproductive cough. COMPARISON: Chest x-ray 10/2/2014. Herlinda Hernandez FINDINGS: PA and lateral radiographs of the chest demonstrate clear lungs. The   cardiac and mediastinal contours and pulmonary vascularity are normal.  There is   tortuosity of the thoracic aorta. The chest wall structures and visualized upper   abdomen appear stable with no acute interval change. VITALS:  Patient Vitals for the past 12 hrs:   Temp Pulse Resp BP SpO2   04/21/17 2000 98.2 °F (36.8 °C) (!) 106 16 168/90 100 %       MEDICATIONS GIVEN:  Medications   albuterol-ipratropium (DUO-NEB) 2.5 MG-0.5 MG/3 ML (3 mL Nebulization Given 4/21/17 7719)       IMPRESSION:  1. Acute bronchitis, unspecified organism    2. Rash and other nonspecific skin eruption        PLAN:  1. Current Discharge Medication List        2. Follow-up Information     Follow up With Details Comments MD Jean Carlos Schedule an appointment as soon as possible for a visit in 3 days  [facility] [address]      Princess Flores MD Schedule an appointment as soon as possible for a visit in 3 days  [place] [address] [phone]          3. Return to ED if worse     Discharge Note:  10:04 PM  The patient has been re-evaluated and is ready for discharge. Reviewed available results with patient. Counseled patient on diagnosis and care plan. Patient has expressed understanding, and all questions have been answered. Patient agrees with plan and agrees to follow up as recommended, or to return to the ED if their symptoms worsen. Discharge instructions have been provided and explained to the patient, along with reasons to return to the ED. This note is prepared by Lizette Plasencia, acting as Scribe for Sumit Patel MD.    Sumit Patel MD: The scribe's documentation has been prepared under my direction and personally reviewed by me in its entirety.  I confirm that the note above accurately reflects all work, treatment, procedures, and medical decision making performed by me.

## 2017-04-22 NOTE — DISCHARGE INSTRUCTIONS
Bronchitis: Care Instructions  Your Care Instructions    Bronchitis is inflammation of the bronchial tubes, which carry air to the lungs. The tubes swell and produce mucus, or phlegm. The mucus and inflamed bronchial tubes make you cough. You may have trouble breathing. Most cases of bronchitis are caused by viruses like those that cause colds. Antibiotics usually do not help and they may be harmful. Bronchitis usually develops rapidly and lasts about 2 to 3 weeks in otherwise healthy people. Follow-up care is a key part of your treatment and safety. Be sure to make and go to all appointments, and call your doctor if you are having problems. It's also a good idea to know your test results and keep a list of the medicines you take. How can you care for yourself at home? · Take all medicines exactly as prescribed. Call your doctor if you think you are having a problem with your medicine. · Get some extra rest.  · Take an over-the-counter pain medicine, such as acetaminophen (Tylenol), ibuprofen (Advil, Motrin), or naproxen (Aleve) to reduce fever and relieve body aches. Read and follow all instructions on the label. · Do not take two or more pain medicines at the same time unless the doctor told you to. Many pain medicines have acetaminophen, which is Tylenol. Too much acetaminophen (Tylenol) can be harmful. · Take an over-the-counter cough medicine that contains dextromethorphan to help quiet a dry, hacking cough so that you can sleep. Avoid cough medicines that have more than one active ingredient. Read and follow all instructions on the label. · Breathe moist air from a humidifier, hot shower, or sink filled with hot water. The heat and moisture will thin mucus so you can cough it out. · Do not smoke. Smoking can make bronchitis worse. If you need help quitting, talk to your doctor about stop-smoking programs and medicines. These can increase your chances of quitting for good.   When should you call for help? Call 911 anytime you think you may need emergency care. For example, call if:  · You have severe trouble breathing. Call your doctor now or seek immediate medical care if:  · You have new or worse trouble breathing. · You cough up dark brown or bloody mucus (sputum). · You have a new or higher fever. · You have a new rash. Watch closely for changes in your health, and be sure to contact your doctor if:  · You cough more deeply or more often, especially if you notice more mucus or a change in the color of your mucus. · You are not getting better as expected. Where can you learn more? Go to http://eris-lalito.info/. Enter H333 in the search box to learn more about \"Bronchitis: Care Instructions. \"  Current as of: May 23, 2016  Content Version: 11.2  © 6899-2999 [employer]. Care instructions adapted under license by [employer] (which disclaims liability or warranty for this information). If you have questions about a medical condition or this instruction, always ask your healthcare professional. [place] [address] any warranty or liability for your use of this information. Rash: Care Instructions  Your Care Instructions  A rash is any irritation or inflammation of the skin. Rashes have many possible causes, including allergy, infection, illness, heat, and emotional stress. Follow-up care is a key part of your treatment and safety. Be sure to make and go to all appointments, and call your doctor if you are having problems. Its also a good idea to know your test results and keep a list of the medicines you take. How can you care for yourself at home? · Wash the area with water only. Soap can make dryness and itching worse. Pat dry. · Put cold, wet cloths on the rash to reduce itching. · Keep cool, and stay out of the sun. · Leave the rash open to the air as much of the time as possible.   · Sometimes petroleum jelly (Vaseline) can help relieve the discomfort caused by a rash. A moisturizing lotion, such as Cetaphil, also may help. Calamine lotion may help for rashes caused by contact with something (such as a plant or soap) that irritated the skin. Use it 3 or 4 times a day. · If your doctor prescribed a cream, use it as directed. If your doctor prescribed medicine, take it exactly as directed. · If your rash itches so badly that it interferes with your normal activities, take an over-the-counter antihistamine, such as diphenhydramine (Benadryl) or loratadine (Claritin). Read and follow all instructions on the label. When should you call for help? Call your doctor now or seek immediate medical care if:  · You have signs of infection, such as:  ¨ Increased pain, swelling, warmth, or redness. ¨ Red streaks leading from the area. ¨ Pus draining from the area. ¨ A fever. · You have joint pain along with the rash. Watch closely for changes in your health, and be sure to contact your doctor if:  · Your rash is changing or getting worse. For example, call if you have pain along with the rash, the rash is spreading, or you have new blisters. · You do not get better after 1 week. Where can you learn more? Go to http://eris-lalito.info/. Enter Z980 in the search box to learn more about \"Rash: Care Instructions. \"  Current as of: October 13, 2016  Content Version: 11.2  © 3261-2679 [employer]. Care instructions adapted under license by [employer] (which disclaims liability or warranty for this information). If you have questions about a medical condition or this instruction, always ask your healthcare professional. [place] [address] any warranty or liability for your use of this information.

## 2017-05-05 ENCOUNTER — OFFICE VISIT (OUTPATIENT)
Dept: INTERNAL MEDICINE CLINIC | Age: 57
End: 2017-05-05

## 2017-05-05 VITALS
DIASTOLIC BLOOD PRESSURE: 65 MMHG | HEIGHT: 66 IN | TEMPERATURE: 98 F | OXYGEN SATURATION: 99 % | HEART RATE: 96 BPM | WEIGHT: 221.9 LBS | RESPIRATION RATE: 18 BRPM | SYSTOLIC BLOOD PRESSURE: 124 MMHG | BODY MASS INDEX: 35.66 KG/M2

## 2017-05-05 DIAGNOSIS — R00.0 TACHYCARDIA: ICD-10-CM

## 2017-05-05 DIAGNOSIS — M79.89 LEG SWELLING: ICD-10-CM

## 2017-05-05 DIAGNOSIS — K21.9 GASTROESOPHAGEAL REFLUX DISEASE, ESOPHAGITIS PRESENCE NOT SPECIFIED: ICD-10-CM

## 2017-05-05 DIAGNOSIS — R05.9 COUGH: Primary | ICD-10-CM

## 2017-05-05 DIAGNOSIS — Z82.49 FAMILY HISTORY OF CARDIAC DISORDER IN MOTHER: ICD-10-CM

## 2017-05-05 DIAGNOSIS — M32.11 SYSTEMIC LUPUS ERYTHEMATOSUS WITH ENDOCARDITIS, UNSPECIFIED SLE TYPE (HCC): ICD-10-CM

## 2017-05-05 DIAGNOSIS — L81.9 HYPERPIGMENTATION OF SKIN: ICD-10-CM

## 2017-05-05 RX ORDER — PHENOL/SODIUM PHENOLATE
20 AEROSOL, SPRAY (ML) MUCOUS MEMBRANE DAILY
Qty: 30 TAB | Refills: 11 | Status: SHIPPED | OUTPATIENT
Start: 2017-05-05 | End: 2018-02-22 | Stop reason: SDUPTHER

## 2017-05-05 RX ORDER — MONTELUKAST SODIUM 10 MG/1
10 TABLET ORAL DAILY
Qty: 30 TAB | Refills: 3 | Status: SHIPPED | OUTPATIENT
Start: 2017-05-05 | End: 2017-12-28

## 2017-05-05 NOTE — PROGRESS NOTES
Chief Complaint   Patient presents with   [facility] Follow Up     (RM 6) Dylan     1. Have you been to the ER, urgent care clinic since your last visit? Hospitalized since your last visit? {Yes Urgent Care/ ED [facility] ED 4/18    2. Have you seen or consulted any other health care providers outside of the [address] [place] [address] since your last visit? Include any pap smears or colon screening.  No

## 2017-05-05 NOTE — PROGRESS NOTES
Chief Complaint   Patient presents with   [facility] Follow Up     (RM 6) Dylan       Subjective:   Nimisha Ragsdale 49 y.o.  female with a  past medical history reviewed see below. comes in today [facility] had increased swelling in feet and dark marke on her feet/leg both lower legs she stopped the doxycyline after a week she did feel a little better but was quite fatigued and she was having lots of sob associated with the swelling has now resolved   She is upset that the  told her to finish medication as she requested several times for an appt with me. She did not finish the prednisone either as she was gaining weight   Hr is going to 100+ while sitting   Has been coughing for quite a while w/ h/o sle need to check a ct chest   She is still in therapy for her hand pain   ROS: otherwise feeling generally well. All other systems reviewed and are negative    Current Outpatient Prescriptions   Medication Sig Dispense Refill    montelukast (SINGULAIR) 10 mg tablet Take 1 Tab by mouth daily. 30 Tab 3    Omeprazole delayed release (PRILOSEC D/R) 20 mg tablet Take 1 Tab by mouth daily. 30 Tab 11    albuterol (PROVENTIL VENTOLIN) 2.5 mg /3 mL (0.083 %) nebulizer solution 1 vial Q 4-6 hrs as needed for cough 1 Package 0    albuterol (PROVENTIL HFA, VENTOLIN HFA, PROAIR HFA) 90 mcg/actuation inhaler Take 1 Puff by inhalation every six (6) hours as needed for Wheezing or Shortness of Breath (cough). 1 Inhaler 3    Peak Flow Meter negrita Use as directed 1 Device 0    CETIRIZINE HCL (ZYRTEC PO) Take  by mouth as needed.  triamterene-hydrochlorothiazide (DYAZIDE) 37.5-25 mg per capsule Take 1 Cap by mouth daily. 90 Cap 3    levothyroxine (SYNTHROID) 112 mcg tablet Take 1 Tab by mouth Daily (before breakfast). SYNTHROID brand name medically necessary 90 Tab 3    ipratropium (ATROVENT) 0.06 % nasal spray 2 Sprays by Both Nostrils route four (4) times daily.  15 mL 11  hydroxychloroquine (PLAQUINIL) 200 mg tablet Take 200 mg by mouth daily.  cyclobenzaprine (FLEXERIL) 5 mg tablet Take 1 Tab by mouth nightly as needed for Muscle Spasm(s). 90 Tab 1    atorvastatin (LIPITOR) 10 mg tablet TAKE 1 TAB BY MOUTH DAILY. 30 Tab 1    MELOXICAM (MOBIC PO) Take  by mouth as needed.  ketotifen (ZADITOR) 0.025 % (0.035 %) ophthalmic solution Administer 1 Drop to both eyes two (2) times a day.  diclofenac EC (VOLTAREN) 75 mg EC tablet Take  by mouth.  loratadine (CLARITIN) 10 mg tablet TAKE 1 TAB BY MOUTH DAILY.  90 Tab 3     Allergies   Allergen Reactions    Duragesic [Fentanyl] Nausea and Vomiting     Past Medical History:   Diagnosis Date    Arthritis     Asthma     H/O seasonal allergies 5/7/2013    Headache     Hypertension     Lupus ([address]     Other ill-defined conditions     Lupus    Thyroid disease      Past Surgical History:   Procedure Laterality Date    HX GYN      Hysterectomy-partial    HX GYN      C Section X1    HX ORTHOPAEDIC      left shoulder surgery, right hand surgery 12/9/2016     Family History   Problem Relation Age of Onset    Hypertension Mother     Heart Disease Mother     Stroke Mother     Hypertension Sister     Heart Disease Sister     Stroke Sister     Heart Disease Brother     Stroke Brother     Hypertension Maternal Grandmother     Heart Disease Brother     Diabetes Sister     Diabetes Sister     Diabetes Brother     Kidney Disease Sister     Diabetes Sister     Hypertension Sister     No Known Problems Daughter     No Known Problems Daughter      Social History   Substance Use Topics    Smoking status: Never Smoker    Smokeless tobacco: Never Used    Alcohol use No          Objective:     Visit Vitals    /65 (BP 1 Location: Left arm, BP Patient Position: Sitting)    Pulse 96    Temp 98 °F (36.7 °C) (Oral)    Resp 18    Ht 5' 6\" (1.676 m)    Wt 221 lb 14.4 oz (100.7 kg)    SpO2 99%    BMI 35.82 kg/m2     Gen: NAD, pleasant  HEENT: normal appearing head, nares patent, PERRLA, EOMI, oropharynx no erythema, no cervical lymphadenopathy neck supple   Cardio: RRR nl S1S2 no murmur  Lungs Cough mild wheeze no rales no rhonchi  ABD Soft non tender non distended + bowel sounds  Extremities: full ROM X 4 no clubbing no cyanosis lower extremities hyperpigmentation (from the lower leg to her feet bilaterally)   Neuro: no gross focal deficits noted, alert and orientated X 3  Psych.: well groomed no outward signs of depression. Assessment/Plan:   Nimisha was seen today for hospital follow up. Diagnoses and all orders for this visit:    Cough  -     BORDETELLA PERTUSSIS/PARA-PCR  -     D DIMER  -     CBC WITH AUTOMATED DIFF  -     Cancel: CT CHEST W WO CONT; Future  -     CT CHEST W CONT; Future    Leg swelling    Hyperpigmentation of skin    Gastroesophageal reflux disease, esophagitis presence not specified  -     MAGNESIUM    Tachycardia  -     REFERRAL TO CARDIOLOGY  -     Cancel: CT CHEST W WO CONT; Future  -     CT CHEST W CONT; Future    Family history of cardiac disorder in mother- brother and nephew  -     REFERRAL TO CARDIOLOGY    Systemic lupus erythematosus with endocarditis, unspecified SLE type  -     Cancel: CT CHEST W WO CONT; Future  -     CT CHEST W CONT; Future    Other orders  -     montelukast (SINGULAIR) 10 mg tablet; Take 1 Tab by mouth daily.  -     Omeprazole delayed release (PRILOSEC D/R) 20 mg tablet; Take 1 Tab by mouth daily. Follow-up Disposition:  Return for 2-4 weeks review ct scan and labs . .  avs printed and given to the pt. .    The patient voiced understanding of the above. Medication side effects were reviewed with the patient. Call with any concerns.

## 2017-05-05 NOTE — PATIENT INSTRUCTIONS
Please follow up with your rheumatologist asap!  You are overdue     [phone] to set up your ct scan

## 2017-05-05 NOTE — MR AVS SNAPSHOT
Visit Information Date & Time Provider Department Dept. Phone Encounter #  
 5/5/2017 10:15 AM Marysol Gordon, [address] [facility] [phone] [id number] Follow-up Instructions Return for 2-4 weeks review ct scan and labs . Upcoming Health Maintenance Date Due Hepatitis C Screening 1960 INFLUENZA AGE 11 TO ADULT 8/1/2017 BREAST CANCER SCRN MAMMOGRAM 4/16/2018 PAP AKA CERVICAL CYTOLOGY 9/16/2019 DTaP/Tdap/Td series (2 - Td) 12/21/2025 COLONOSCOPY 3/8/2026 Allergies as of 5/5/2017  Review Complete On: 5/5/2017 By: Marysol Gordon MD  
  
 Severity Noted Reaction Type Reactions Duragesic [Fentanyl]  06/07/2013    Nausea and Vomiting Current Immunizations  Reviewed on 1/23/2017 Name Date Tdap 12/21/2015 Not reviewed this visit You Were Diagnosed With   
  
 Codes Comments Cough    -  Primary ICD-10-CM: M48 ICD-9-CM: 786.2 Leg swelling     ICD-10-CM: M79.89 ICD-9-CM: 729.81 Hyperpigmentation of skin     ICD-10-CM: L81.9 ICD-9-CM: 709.00 Gastroesophageal reflux disease, esophagitis presence not specified     ICD-10-CM: K21.9 ICD-9-CM: 530.81 Tachycardia     ICD-10-CM: R00.0 ICD-9-CM: 813. 0 Family history of cardiac disorder in mother     ICD-10-CM: Z80.55 
ICD-9-CM: V17.49 Systemic lupus erythematosus with endocarditis, unspecified SLE type     ICD-10-CM: M32.11 ICD-9-CM: 710.0, 424.91 Vitals BP Pulse Temp Resp Height(growth percentile) Weight(growth percentile) 124/65 (BP 1 Location: Left arm, BP Patient Position: Sitting) 96 98 °F (36.7 °C) (Oral) 18 5' 6\" (1.676 m) 221 lb 14.4 oz (100.7 kg) SpO2 BMI OB Status Smoking Status 99% 35.82 kg/m2 Hysterectomy Never Smoker BMI and BSA Data Body Mass Index Body Surface Area  
 35.82 kg/m 2 2.17 m 2 Preferred Pharmacy Pharmacy Name Phone [facility]/PHARMACY #[id number]- [place], [place] - [address] [phone] Your Updated Medication List  
  
   
This list is accurate as of: 5/5/17 10:43 AM.  Always use your most recent med list.  
  
  
  
  
 * albuterol 2.5 mg /3 mL (0.083 %) nebulizer solution Commonly known as:  PROVENTIL VENTOLIN  
1 vial Q 4-6 hrs as needed for cough * albuterol 90 mcg/actuation inhaler Commonly known as:  PROVENTIL HFA, VENTOLIN HFA, PROAIR HFA Take 1 Puff by inhalation every six (6) hours as needed for Wheezing or Shortness of Breath (cough). atorvastatin 10 mg tablet Commonly known as:  LIPITOR  
TAKE 1 TAB BY MOUTH DAILY. cyclobenzaprine 5 mg tablet Commonly known as:  FLEXERIL Take 1 Tab by mouth nightly as needed for Muscle Spasm(s). diclofenac EC 75 mg EC tablet Commonly known as:  VOLTAREN Take  by mouth. hydroxychloroquine 200 mg tablet Commonly known as:  PLAQUENIL Take 200 mg by mouth daily. ipratropium 0.06 % nasal spray Commonly known as:  ATROVENT  
2 Sprays by Both Nostrils route four (4) times daily. ketotifen 0.025 % (0.035 %) ophthalmic solution Commonly known as:  ZADITOR Administer 1 Drop to both eyes two (2) times a day. levothyroxine 112 mcg tablet Commonly known as:  SYNTHROID Take 1 Tab by mouth Daily (before breakfast). SYNTHROID brand name medically necessary  
  
 loratadine 10 mg tablet Commonly known as:  CLARITIN  
TAKE 1 TAB BY MOUTH DAILY. MOBIC PO Take  by mouth as needed. montelukast 10 mg tablet Commonly known as:  SINGULAIR Take 1 Tab by mouth daily. Omeprazole delayed release 20 mg tablet Commonly known as:  PRILOSEC D/R Take 1 Tab by mouth daily. Peak Flow Meter Jayme Lima Use as directed  
  
 triamterene-hydroCHLOROthiazide 37.5-25 mg per capsule Commonly known as:  Adele Louis Take 1 Cap by mouth daily.   
  
 ZYRTEC PO  
 Take  by mouth as needed. * Notice: This list has 2 medication(s) that are the same as other medications prescribed for you. Read the directions carefully, and ask your doctor or other care provider to review them with you. Prescriptions Sent to Pharmacy Refills  
 montelukast (SINGULAIR) 10 mg tablet 3 Sig: Take 1 Tab by mouth daily. Class: Normal  
 Pharmacy: [facility]/pharmacy [address] Ph #: [phone] Route: Oral  
 Omeprazole delayed release (PRILOSEC D/R) 20 mg tablet 11 Sig: Take 1 Tab by mouth daily. Class: Normal  
 Pharmacy: [facility]acy [address] [facility] [address] Ph #: [phone] Route: Oral  
  
We Performed the Following BORDETELLA PERTUSSIS/PARA-PCR D386268 CPT(R)] CBC WITH AUTOMATED DIFF [57488 CPT(R)] D DIMER [id number] CPT(R)] MAGNESIUM [id number] CPT(R)] REFERRAL TO CARDIOLOGY [JKI14 Custom] Comments:  
 Please evaluate patient for tachycardia Follow-up Instructions Return for 2-4 weeks review ct scan and labs . To-Do List   
 05/05/2017 Imaging:  CT CHEST W WO CONT Referral Information Referral ID Referred By Referred To  
  
 LUANA Rao MD   
   [address], [facility] Phone: [phone] Fax: [phone] Visits Status Start Date End Date 1 New Request 5/5/17 5/5/18 If your referral has a status of pending review or denied, additional information will be sent to support the outcome of this decision. Patient Instructions Please follow up with your rheumatologist asap! You are overdue [phone] to set up your ct scan Introducing hospitals & HEALTH SERVICES! Dear Favian Hutchison: Thank you for requesting a BigDeal account.   Our records indicate that you already have an active Plix account. You can access your account anytime at [email or web address] Did you know that you can access your hospital and ER discharge instructions at any time in Plix? You can also review all of your test results from your hospital stay or ER visit. Additional Information If you have questions, please visit the Frequently Asked Questions section of the Plix website at [email or web address] Remember, Plix is NOT to be used for urgent needs. For medical emergencies, dial 911. Now available from your iPhone and Android! Please provide this summary of care documentation to your next provider. Your primary care clinician is listed as Garrett Iyer. If you have any questions after today's visit, please call [phone].

## 2017-05-08 LAB
B PARAPERT DNA SPEC QL NAA+PROBE: NORMAL
B PERT DNA SPEC QL NAA+PROBE: NORMAL
BASOPHILS # BLD AUTO: 0 X10E3/UL (ref 0–0.2)
BASOPHILS NFR BLD AUTO: 0 %
D DIMER PPP FEU-MCNC: 0.31 MG/L FEU (ref 0–0.49)
EOSINOPHIL # BLD AUTO: 0.2 X10E3/UL (ref 0–0.4)
EOSINOPHIL NFR BLD AUTO: 4 %
ERYTHROCYTE [DISTWIDTH] IN BLOOD BY AUTOMATED COUNT: 13.7 % (ref 12.3–15.4)
HCT VFR BLD AUTO: 36 % (ref 34–46.6)
HGB BLD-MCNC: 11.5 G/DL (ref 11.1–15.9)
IMM GRANULOCYTES # BLD: 0 X10E3/UL (ref 0–0.1)
IMM GRANULOCYTES NFR BLD: 0 %
LYMPHOCYTES # BLD AUTO: 2 X10E3/UL (ref 0.7–3.1)
LYMPHOCYTES NFR BLD AUTO: 37 %
MAGNESIUM SERPL-MCNC: 2.2 MG/DL (ref 1.6–2.3)
MCH RBC QN AUTO: 28.9 PG (ref 26.6–33)
MCHC RBC AUTO-ENTMCNC: 31.9 G/DL (ref 31.5–35.7)
MCV RBC AUTO: 91 FL (ref 79–97)
MONOCYTES # BLD AUTO: 0.4 X10E3/UL (ref 0.1–0.9)
MONOCYTES NFR BLD AUTO: 7 %
NEUTROPHILS # BLD AUTO: 2.8 X10E3/UL (ref 1.4–7)
NEUTROPHILS NFR BLD AUTO: 52 %
PLATELET # BLD AUTO: 272 X10E3/UL (ref 150–379)
RBC # BLD AUTO: 3.98 X10E6/UL (ref 3.77–5.28)
WBC # BLD AUTO: 5.5 X10E3/UL (ref 3.4–10.8)

## 2017-05-10 RX ORDER — ATORVASTATIN CALCIUM 10 MG/1
TABLET, FILM COATED ORAL
Qty: 30 TAB | Refills: 1 | Status: SHIPPED | OUTPATIENT
Start: 2017-05-10 | End: 2017-09-06 | Stop reason: SDUPTHER

## 2017-05-15 ENCOUNTER — HOSPITAL ENCOUNTER (OUTPATIENT)
Dept: CT IMAGING | Age: 57
Discharge: HOME | End: 2017-05-15
Attending: FAMILY MEDICINE
Payer: COMMERCIAL

## 2017-05-15 DIAGNOSIS — R05.9 COUGH: ICD-10-CM

## 2017-05-15 DIAGNOSIS — M32.11 SYSTEMIC LUPUS ERYTHEMATOSUS WITH ENDOCARDITIS, UNSPECIFIED SLE TYPE (HCC): ICD-10-CM

## 2017-05-15 DIAGNOSIS — R00.0 TACHYCARDIA: ICD-10-CM

## 2017-05-15 PROCEDURE — 74011250636 HC RX REV CODE- 250/636: Performed by: FAMILY MEDICINE

## 2017-05-15 PROCEDURE — 74011636320 HC RX REV CODE- 636/320: Performed by: FAMILY MEDICINE

## 2017-05-15 PROCEDURE — 71260 CT THORAX DX C+: CPT

## 2017-05-15 RX ORDER — SODIUM CHLORIDE 0.9 % (FLUSH) 0.9 %
10 SYRINGE (ML) INJECTION
Status: DISCONTINUED | OUTPATIENT
Start: 2017-05-15 | End: 2017-05-15

## 2017-05-15 RX ORDER — SODIUM CHLORIDE 0.9 % (FLUSH) 0.9 %
10 SYRINGE (ML) INJECTION
Status: COMPLETED | OUTPATIENT
Start: 2017-05-15 | End: 2017-05-15

## 2017-05-15 RX ORDER — SODIUM CHLORIDE 9 MG/ML
50 INJECTION, SOLUTION INTRAVENOUS
Status: DISCONTINUED | OUTPATIENT
Start: 2017-05-15 | End: 2017-05-15

## 2017-05-15 RX ORDER — SODIUM CHLORIDE 9 MG/ML
50 INJECTION, SOLUTION INTRAVENOUS
Status: COMPLETED | OUTPATIENT
Start: 2017-05-15 | End: 2017-05-15

## 2017-05-15 RX ADMIN — SODIUM CHLORIDE 50 ML/HR: 900 INJECTION, SOLUTION INTRAVENOUS at 07:30

## 2017-05-15 RX ADMIN — Medication 10 ML: at 07:30

## 2017-05-15 RX ADMIN — IOPAMIDOL 80 ML: 612 INJECTION, SOLUTION INTRAVENOUS at 07:29

## 2017-05-19 DIAGNOSIS — M25.50 JOINT PAIN: ICD-10-CM

## 2017-05-22 NOTE — TELEPHONE ENCOUNTER
From: Rowena Just  To: Tana Hill MD  Sent: 5/19/2017 9:59 PM EDT  Subject: Medication Renewal Request    Original authorizing provider: MD Rowena Merrill would like a refill of the following medications:  cyclobenzaprine (FLEXERIL) 5 mg tablet [Zain Allan MD]    Preferred pharmacy: [facility]/PHARMACY [address] [place] [address]    Comment:

## 2017-05-26 RX ORDER — CYCLOBENZAPRINE HCL 5 MG
5 TABLET ORAL
Qty: 90 TAB | Refills: 1 | Status: SHIPPED | OUTPATIENT
Start: 2017-05-26 | End: 2017-12-28

## 2017-06-06 ENCOUNTER — OFFICE VISIT (OUTPATIENT)
Dept: INTERNAL MEDICINE CLINIC | Age: 57
End: 2017-06-06

## 2017-06-06 VITALS
WEIGHT: 221 LBS | TEMPERATURE: 97.2 F | HEIGHT: 66 IN | RESPIRATION RATE: 17 BRPM | DIASTOLIC BLOOD PRESSURE: 72 MMHG | HEART RATE: 82 BPM | OXYGEN SATURATION: 97 % | SYSTOLIC BLOOD PRESSURE: 122 MMHG | BODY MASS INDEX: 35.52 KG/M2

## 2017-06-06 DIAGNOSIS — Z71.2 ENCOUNTER TO DISCUSS TEST RESULTS: ICD-10-CM

## 2017-06-06 DIAGNOSIS — J39.2 THROAT IRRITATION: ICD-10-CM

## 2017-06-06 DIAGNOSIS — D71 GRANULOMATOUS DISEASE (HCC): Primary | ICD-10-CM

## 2017-06-06 DIAGNOSIS — M32.13 OTHER SYSTEMIC LUPUS ERYTHEMATOSUS WITH LUNG INVOLVEMENT (HCC): ICD-10-CM

## 2017-06-06 DIAGNOSIS — R05.3 PERSISTENT DRY COUGH: ICD-10-CM

## 2017-06-06 RX ORDER — MELOXICAM 15 MG/1
TABLET ORAL
Refills: 3 | COMMUNITY
Start: 2017-05-25 | End: 2017-12-28

## 2017-06-06 NOTE — PROGRESS NOTES
Chief Complaint   Patient presents with    Results    Hypertension     1. Have you been to the ER, urgent care clinic since your last visit? Hospitalized since your last visit? No    2. Have you seen or consulted any other health care providers outside of the [address] [place] [address] since your last visit? Include any pap smears or colon screening.  No

## 2017-06-06 NOTE — MR AVS SNAPSHOT
Visit Information Date & Time Provider Department Dept. Phone Encounter #  
 6/6/2017  8:15  [facility] [place] [phone] [id number] Follow-up Instructions Return in about 3 months (around 9/6/2017) for recheck htn/follow up on referrals . Your Appointments 6/12/2017  2:30 PM  
[facility] with [address] [facility]) Appt Note: referred by Dr. Jesús Restrepo, abnormal CT and hx of heart disease in [facility]  
[phone] [address] Upcoming Health Maintenance Date Due Hepatitis C Screening 1960 INFLUENZA AGE 9 TO ADULT 8/1/2017 BREAST CANCER SCRN MAMMOGRAM 4/16/2018 PAP AKA CERVICAL CYTOLOGY 9/16/2019 DTaP/Tdap/Td series (2 - Td) 12/21/2025 COLONOSCOPY 3/8/2026 Allergies as of 6/6/2017  Review Complete On: 6/6/2017 By: Katia Caal Severity Noted Reaction Type Reactions Duragesic [Fentanyl]  06/07/2013    Nausea and Vomiting Current Immunizations  Reviewed on 1/23/2017 Name Date Tdap 12/21/2015 Not reviewed this visit You Were Diagnosed With   
  
 Codes Comments Granulomatous disease ([facility]    -  Primary ICD-10-CM: S50 ICD-9-CM: 288.1 Other systemic lupus erythematosus with lung involvement ([facility]     ICD-10-CM: M32.13 Persistent dry cough     ICD-10-CM: R05 ICD-9-CM: 786.2 Throat irritation     ICD-10-CM: J39.2 ICD-9-CM: 478.29 Encounter to discuss test results     ICD-10-CM: Z71.89 ICD-9-CM: V65.49 Vitals BP Pulse Temp Resp Height(growth percentile) Weight(growth percentile) 122/72 (BP 1 Location: Left arm, BP Patient Position: Sitting) 82 97.2 °F (36.2 °C) 17 5' 6\" (1.676 m) 221 lb (100.2 kg) SpO2 BMI OB Status Smoking Status 97% 35.67 kg/m2 Hysterectomy Never Smoker Vitals History BMI and BSA Data Body Mass Index Body Surface Area  
 35.67 kg/m 2 2.16 m 2 Preferred Pharmacy Pharmacy Name Phone [facility]/PHARMACY #[id number]- [place], [place] - [address] [phone] Your Updated Medication List  
  
   
This list is accurate as of: 6/6/17  9:07 AM.  Always use your most recent med list.  
  
  
  
  
 * albuterol 2.5 mg /3 mL (0.083 %) nebulizer solution Commonly known as:  PROVENTIL VENTOLIN  
1 vial Q 4-6 hrs as needed for cough * albuterol 90 mcg/actuation inhaler Commonly known as:  PROVENTIL HFA, VENTOLIN HFA, PROAIR HFA Take 1 Puff by inhalation every six (6) hours as needed for Wheezing or Shortness of Breath (cough). atorvastatin 10 mg tablet Commonly known as:  LIPITOR  
TAKE 1 TAB BY MOUTH DAILY. cyclobenzaprine 5 mg tablet Commonly known as:  FLEXERIL Take 1 Tab by mouth nightly as needed for Muscle Spasm(s). diclofenac EC 75 mg EC tablet Commonly known as:  VOLTAREN Take  by mouth. hydroxychloroquine 200 mg tablet Commonly known as:  PLAQUENIL Take 200 mg by mouth daily. ipratropium 0.06 % nasal spray Commonly known as:  ATROVENT  
2 Sprays by Both Nostrils route four (4) times daily. ketotifen 0.025 % (0.035 %) ophthalmic solution Commonly known as:  ZADITOR Administer 1 Drop to both eyes two (2) times a day. levothyroxine 112 mcg tablet Commonly known as:  SYNTHROID Take 1 Tab by mouth Daily (before breakfast). SYNTHROID brand name medically necessary  
  
 loratadine 10 mg tablet Commonly known as:  CLARITIN  
TAKE 1 TAB BY MOUTH DAILY. * MOBIC PO Take  by mouth as needed. * meloxicam 15 mg tablet Commonly known as:  MOBIC  
TAKE 1 TABLET BY MOUTH EVERY DAY WITH FOOD AS NEEDED  
  
 montelukast 10 mg tablet Commonly known as:  SINGULAIR  
 Take 1 Tab by mouth daily. Omeprazole delayed release 20 mg tablet Commonly known as:  PRILOSEC D/R Take 1 Tab by mouth daily. Peak Flow Meter Kevan Fahadluis carlos Use as directed  
  
 triamterene-hydroCHLOROthiazide 37.5-25 mg per capsule Commonly known as:  Jonotarsen Crome Take 1 Cap by mouth daily. ZYRTEC PO Take  by mouth as needed. * Notice: This list has 4 medication(s) that are the same as other medications prescribed for you. Read the directions carefully, and ask your doctor or other care provider to review them with you. We Performed the Following REFERRAL TO GASTROENTEROLOGY [LRY62 Custom] Comments:  
 Please evaluate patient for possible EGD REFERRAL TO PULMONARY DISEASE [BFG68 Custom] Comments:  
 Please evaluate patient for abnl ct -granulomatous dz Follow-up Instructions Return in about 3 months (around 9/6/2017) for recheck htn/follow up on referrals . Referral Information Referral ID Referred By Referred To [id number] LUANA [facility] [address] [place], [address] Visits Status Start Date End Date 1 New Request 6/6/17 6/6/18 If your referral has a status of pending review or denied, additional information will be sent to support the outcome of this decision. Referral ID Referred By Referred To [id number] JENNIFER, [address] [facility] [address] [place], [address] Visits Status Start Date End Date 1 New Request 6/6/17 6/6/18 If your referral has a status of pending review or denied, additional information will be sent to support the outcome of this decision. Introducing [facility] & HEALTH SERVICES! Dear Porfirio Never: Thank you for requesting a Videostir account. Our records indicate that you already have an active Videostir account.   You can access your account anytime at [email or web address] Did you know that you can access your hospital and ER discharge instructions at any time in PopularMedia? You can also review all of your test results from your hospital stay or ER visit. Additional Information If you have questions, please visit the Frequently Asked Questions section of the PopularMedia website at [email or web address] Remember, PopularMedia is NOT to be used for urgent needs. For medical emergencies, dial 911. Now available from your iPhone and Android! Please provide this summary of care documentation to your next provider. Your primary care clinician is listed as Isidro Grey. If you have any questions after today's visit, please call [phone].

## 2017-06-06 NOTE — PROGRESS NOTES
Chief Complaint   Patient presents with    Results      Rm 6    Hypertension         Subjective:   Nimisha Ragsdale 49 y.o.  female with a  past medical history reviewed see below. Dry cough  meds used none, no fever or chills but feels like something is irritated in the back of her throat. Onset of throat irritation a few days ago and it feels like something is rubbing against it and its raw. She is here for test results as well as a bp check. She has been winded walking a few steps and has had some swelling in her legs a few days ago not now. ROS: otherwise feeling generally well. All other systems reviewed and are negative      Current Outpatient Prescriptions   Medication Sig Dispense Refill    meloxicam (MOBIC) 15 mg tablet TAKE 1 TABLET BY MOUTH EVERY DAY WITH FOOD AS NEEDED  3    cyclobenzaprine (FLEXERIL) 5 mg tablet Take 1 Tab by mouth nightly as needed for Muscle Spasm(s). 90 Tab 1    atorvastatin (LIPITOR) 10 mg tablet TAKE 1 TAB BY MOUTH DAILY. 30 Tab 1    montelukast (SINGULAIR) 10 mg tablet Take 1 Tab by mouth daily. 30 Tab 3    Omeprazole delayed release (PRILOSEC D/R) 20 mg tablet Take 1 Tab by mouth daily. 30 Tab 11    albuterol (PROVENTIL VENTOLIN) 2.5 mg /3 mL (0.083 %) nebulizer solution 1 vial Q 4-6 hrs as needed for cough 1 Package 0    albuterol (PROVENTIL HFA, VENTOLIN HFA, PROAIR HFA) 90 mcg/actuation inhaler Take 1 Puff by inhalation every six (6) hours as needed for Wheezing or Shortness of Breath (cough). 1 Inhaler 3    Peak Flow Meter negrita Use as directed 1 Device 0    CETIRIZINE HCL (ZYRTEC PO) Take  by mouth as needed.  triamterene-hydrochlorothiazide (DYAZIDE) 37.5-25 mg per capsule Take 1 Cap by mouth daily. 90 Cap 3    levothyroxine (SYNTHROID) 112 mcg tablet Take 1 Tab by mouth Daily (before breakfast). SYNTHROID brand name medically necessary 90 Tab 3    diclofenac EC (VOLTAREN) 75 mg EC tablet Take  by mouth.       loratadine (CLARITIN) 10 mg tablet TAKE 1 TAB BY MOUTH DAILY. 90 Tab 3    ipratropium (ATROVENT) 0.06 % nasal spray 2 Sprays by Both Nostrils route four (4) times daily. 15 mL 11    MELOXICAM (MOBIC PO) Take  by mouth as needed.  ketotifen (ZADITOR) 0.025 % (0.035 %) ophthalmic solution Administer 1 Drop to both eyes two (2) times a day.  hydroxychloroquine (PLAQUINIL) 200 mg tablet Take 200 mg by mouth daily.          Allergies   Allergen Reactions    Duragesic [Fentanyl] Nausea and Vomiting     Past Medical History:   Diagnosis Date    Arthritis     Asthma     H/O seasonal allergies 5/7/2013    Headache     Hypertension     Lupus ([address]     Other ill-defined conditions     Lupus    Thyroid disease      Past Surgical History:   Procedure Laterality Date    HX GYN      Hysterectomy-partial    HX GYN      C Section X1    HX ORTHOPAEDIC      left shoulder surgery, right hand surgery 12/9/2016     Family History   Problem Relation Age of Onset    Hypertension Mother     Heart Disease Mother     Stroke Mother     Hypertension Sister     Heart Disease Sister     Stroke Sister     Heart Disease Brother     Stroke Brother     Hypertension Maternal Grandmother     Heart Disease Brother     Diabetes Sister     Diabetes Sister     Diabetes Brother     Kidney Disease Sister     Diabetes Sister     Hypertension Sister     No Known Problems Daughter     No Known Problems Daughter      Social History   Substance Use Topics    Smoking status: Never Smoker    Smokeless tobacco: Never Used    Alcohol use No          Objective:     Visit Vitals    /72 (BP 1 Location: Left arm, BP Patient Position: Sitting)    Pulse 82    Temp 97.2 °F (36.2 °C)    Resp 17    Ht 5' 6\" (1.676 m)    Wt 221 lb (100.2 kg)    SpO2 97%    BMI 35.67 kg/m2     Gen: NAD, pleasant  HEENT: normal appearing head, nares patent, PERRLA, EOMI, oropharynx mild looks irritated no significant erythema no masses noted, no cervical lymphadenopathy neck supple thyroid wnl   Cardio: RRR nl S1S2 no murmur  Lungs CTAB no wheeze no rales no rhonchi  ABD Soft non tender non distended + bowel sounds  Extremities: full ROM X 4 no clubbing no cyanosis no significant edema in legs   Neuro: no gross focal deficits noted, alert and orientated X 3  Psych.: well groomed no outward signs of depression. Assessment/Plan:   Nimisha was seen today for results and hypertension. Diagnoses and all orders for this visit:    Granulomatous disease ([facility]  -     REFERRAL TO PULMONARY DISEASE    Other systemic lupus erythematosus with lung involvement ([facility]  -     REFERRAL TO PULMONARY DISEASE    Persistent dry cough  -     REFERRAL TO GASTROENTEROLOGY    Throat irritation  -     REFERRAL TO GASTROENTEROLOGY    Encounter to discuss test results      Follow-up Disposition:  Return in about 3 months (around 9/6/2017) for recheck htn/follow up on referrals . .  avs printed and given to the pt. .  May need to repeat echo will have pt see pulmonologist first she is already set to see the cardiologist.encouraged pt to see if using her PPI daily would help her symptoms if not go to the GI doctor    The patient voiced understanding of the above. Medication side effects were reviewed with the patient. Call with any concerns.

## 2017-07-13 ENCOUNTER — OFFICE VISIT (OUTPATIENT)
Dept: CARDIOLOGY CLINIC | Age: 57
End: 2017-07-13

## 2017-07-13 VITALS
SYSTOLIC BLOOD PRESSURE: 114 MMHG | HEART RATE: 91 BPM | OXYGEN SATURATION: 98 % | WEIGHT: 218.2 LBS | BODY MASS INDEX: 35.07 KG/M2 | DIASTOLIC BLOOD PRESSURE: 90 MMHG | RESPIRATION RATE: 16 BRPM | HEIGHT: 66 IN

## 2017-07-13 DIAGNOSIS — R60.9 EDEMA, UNSPECIFIED TYPE: ICD-10-CM

## 2017-07-13 DIAGNOSIS — E78.2 MIXED HYPERLIPIDEMIA: ICD-10-CM

## 2017-07-13 DIAGNOSIS — I10 ESSENTIAL HYPERTENSION: Primary | ICD-10-CM

## 2017-07-13 NOTE — PROGRESS NOTES
Chief Complaint   Patient presents with    New Patient     chest achy that radiates to her back, sob, some swelling in ankles

## 2017-07-13 NOTE — PROGRESS NOTES
Subjective/HPI:     Woody Barr is a 49 y.o. female is here for new patient consultation. The patient reports SOB with exertion. No chest pain, palpitations. She has some lower extremity edema. No PND, orthopnea. She had a normal stress test 4/17. Does not smoke. Has significant family h/o CAD. Has h/o asthma. Being seen by pulmonary, GI.     Patient Active Problem List    Diagnosis Date Noted    Edema 07/13/2017    Gastroesophageal reflux disease 05/05/2017    Chronic tension-type headache, not intractable 02/09/2016    Obstructive sleep apnea 02/09/2016    Migraine with aura and without status migrainosus, not intractable 02/09/2016    Stenosis of both carotid arteries without infarction 02/09/2016    Transient vision disturbance of both eyes 02/09/2016    Allergic rhinitis due to pollen 04/16/2015    H/O seasonal allergies 05/07/2013    HTN (hypertension) 05/07/2013    SLE (systemic lupus erythematosus) ([facility] [facility] 05/07/2013    Asthma 05/07/2013    Joint pain 05/07/2013    Hypothyroid 05/07/2013    Thyroid nodule 05/07/2013      Marina Ta MD  Past Medical History:   Diagnosis Date    Arthritis     Asthma     H/O seasonal allergies 5/7/2013    Headache     Hypertension     Lupus ([facility] [facility]     Other ill-defined conditions     Lupus    Thyroid disease       Past Surgical History:   Procedure Laterality Date    HX GYN      Hysterectomy-partial    HX GYN      C Section X1    HX ORTHOPAEDIC      left shoulder surgery, right hand surgery 12/9/2016     Allergies   Allergen Reactions    Duragesic [Fentanyl] Nausea and Vomiting      Family History   Problem Relation Age of Onset    Hypertension Mother     Heart Disease Mother    Iris Duke Stroke Mother     Hypertension Sister     Heart Disease Sister     Stroke Sister     Heart Disease Brother     Stroke Brother     Hypertension Maternal Grandmother     Heart Disease Brother     Diabetes Sister     Diabetes Sister    Iris Duke Diabetes Brother     Kidney Disease Sister     Diabetes Sister     Hypertension Sister     No Known Problems Daughter     No Known Problems Daughter       Current Outpatient Prescriptions   Medication Sig    meloxicam (MOBIC) 15 mg tablet TAKE 1 TABLET BY MOUTH EVERY DAY WITH FOOD AS NEEDED    cyclobenzaprine (FLEXERIL) 5 mg tablet Take 1 Tab by mouth nightly as needed for Muscle Spasm(s).  atorvastatin (LIPITOR) 10 mg tablet TAKE 1 TAB BY MOUTH DAILY.  Omeprazole delayed release (PRILOSEC D/R) 20 mg tablet Take 1 Tab by mouth daily.  albuterol (PROVENTIL VENTOLIN) 2.5 mg /3 mL (0.083 %) nebulizer solution 1 vial Q 4-6 hrs as needed for cough    albuterol (PROVENTIL HFA, VENTOLIN HFA, PROAIR HFA) 90 mcg/actuation inhaler Take 1 Puff by inhalation every six (6) hours as needed for Wheezing or Shortness of Breath (cough).  Peak Flow Meter negrita Use as directed    CETIRIZINE HCL (ZYRTEC PO) Take  by mouth as needed.  triamterene-hydrochlorothiazide (DYAZIDE) 37.5-25 mg per capsule Take 1 Cap by mouth daily.  levothyroxine (SYNTHROID) 112 mcg tablet Take 1 Tab by mouth Daily (before breakfast). SYNTHROID brand name medically necessary    ipratropium (ATROVENT) 0.06 % nasal spray 2 Sprays by Both Nostrils route four (4) times daily.  hydroxychloroquine (PLAQUINIL) 200 mg tablet Take 200 mg by mouth daily.  montelukast (SINGULAIR) 10 mg tablet Take 1 Tab by mouth daily.  MELOXICAM (MOBIC PO) Take  by mouth as needed.  ketotifen (ZADITOR) 0.025 % (0.035 %) ophthalmic solution Administer 1 Drop to both eyes two (2) times a day.  diclofenac EC (VOLTAREN) 75 mg EC tablet Take  by mouth.  loratadine (CLARITIN) 10 mg tablet TAKE 1 TAB BY MOUTH DAILY. No current facility-administered medications for this visit.        Vitals:    07/13/17 1355 07/13/17 1409   BP: 112/90 114/90   Pulse: 91    Resp: 16    SpO2: 98%    Weight: 218 lb 3.2 oz (99 kg)    Height: 5' 6\" (1.676 m)        I have reviewed the nurses notes, vitals, problem list, allergy list, medical history, family, social history and medications. Review of Symptoms:    General: Pt denies excessive weight gain or loss. Pt is able to conduct ADL's  HEENT: Denies blurred vision, headaches, epistaxis and difficulty swallowing. Respiratory: Denies shortness of breath, GARRETT, wheezing or stridor. Cardiovascular: Denies precordial pain, palpitations, edema or PND  Gastrointestinal: Denies poor appetite, indigestion, abdominal pain or blood in stool  Musculoskeletal: Denies pain or swelling from muscles or joints  Neurologic: Denies tremor, paresthesias, or sensory motor disturbance  Skin: Denies rash, itching or texture change. Physical Exam:      General: Well developed, cooperative, alert in no acute distress, appears states age. HEENT: Supple, No carotid bruits, no JVD, trach is midline. PERRL, EOM intact  Heart:  Normal S1/S2 negative S3 or S4. Regular, no murmur, gallop or rub.   Respiratory: Clear bilaterally x 4, no wheezing or rales  Abdomen:   Soft, non-tender, no masses, bowel sounds are active.   Extremities:  No edema, normal cap refill, no cyanosis, atraumatic. Neuro: A&Ox3, speech clear, gait stable. Skin: Skin color is normal. No rashes or lesions. Non diaphoretic  Vascular: 2+ pulses symmetric in all extremities    Cardiographics    ECG: sinus rythm       Assessment:     Assessment:        ICD-10-CM ICD-9-CM    1. Essential hypertension I10 401.9 AMB POC EKG ROUTINE W/ 12 LEADS, INTER & REP      2D ECHO COMPLETE ADULT (TTE) W OR WO CONTR   2. Edema, unspecified type R60.9 782.3 2D ECHO COMPLETE ADULT (TTE) W OR WO CONTR   3.  Mixed hyperlipidemia E78.2 272.2      Orders Placed This Encounter    AMB POC EKG ROUTINE W/ 12 LEADS, INTER & REP     Order Specific Question:   Reason for Exam:     Answer:   routine    2D ECHO COMPLETE ADULT (TTE) W OR WO CONTR     Standing Status:   Future     Standing Expiration Date: 1/13/2018     Order Specific Question:   Reason for Exam:     Answer:   edema     Order Specific Question:   Contrast Enhancement (Bubble Study, Definity, Optison) may be used if criteria listed in established evidence-based protocol has been identified. Answer:   Yes       PLAN:    Patient presents with SOB with exertion, edema. Had a normal stress test 4/17. Will evaluate with echo for valvular heart disease, diastology. F/u after testing.     Hu Gillespie MD

## 2017-07-13 NOTE — MR AVS SNAPSHOT
Visit Information Date & Time Provider Department Dept. Phone Encounter #  
 7/13/2017  2:00 PM Caitlin Castañeda, [address] [facility] [phone] [id number] Your Appointments 9/6/2017  8:15 AM  
ROUTINE CARE with Nai Rouse MD  
[facility] ([address]) Appt Note: 3mo fu  
 [address] [facility],[address] [facility] [phone]  
[phone]  
  
   
 [address] [place] [address],[address] [place] [phone] Upcoming Health Maintenance Date Due Hepatitis C Screening 1960 INFLUENZA AGE 9 TO ADULT 8/1/2017 BREAST CANCER SCRN MAMMOGRAM 4/16/2018 PAP AKA CERVICAL CYTOLOGY 9/16/2019 DTaP/Tdap/Td series (2 - Td) 12/21/2025 COLONOSCOPY 3/8/2026 Allergies as of 7/13/2017  Review Complete On: 7/13/2017 By: Thien Carter LPN Severity Noted Reaction Type Reactions Duragesic [Fentanyl]  06/07/2013    Nausea and Vomiting Current Immunizations  Reviewed on 1/23/2017 Name Date Tdap 12/21/2015 Not reviewed this visit You Were Diagnosed With   
  
 Codes Comments Essential hypertension    -  Primary ICD-10-CM: I10 
ICD-9-CM: 401.9 Edema, unspecified type     ICD-10-CM: R60.9 ICD-9-CM: 221. 3 Vitals BP Pulse Resp Height(growth percentile) Weight(growth percentile) SpO2  
 114/90 (BP 1 Location: Right arm, BP Patient Position: Sitting) 91 16 5' 6\" (1.676 m) 218 lb 3.2 oz (99 kg) 98% BMI OB Status Smoking Status 35.22 kg/m2 Hysterectomy Never Smoker Vitals History BMI and BSA Data Body Mass Index Body Surface Area  
 35.22 kg/m 2 2.15 m 2 Preferred Pharmacy Pharmacy Name Phone [id number], [place] - [address] [phone] Your Updated Medication List  
  
   
This list is accurate as of: 7/13/17  2:53 PM.  Always use your most recent med list.  
  
  
  
  
 * albuterol 2.5 mg /3 mL (0.083 %) nebulizer solution Commonly known as:  PROVENTIL VENTOLIN  
1 vial Q 4-6 hrs as needed for cough * albuterol 90 mcg/actuation inhaler Commonly known as:  PROVENTIL HFA, VENTOLIN HFA, PROAIR HFA Take 1 Puff by inhalation every six (6) hours as needed for Wheezing or Shortness of Breath (cough). atorvastatin 10 mg tablet Commonly known as:  LIPITOR  
TAKE 1 TAB BY MOUTH DAILY. cyclobenzaprine 5 mg tablet Commonly known as:  FLEXERIL Take 1 Tab by mouth nightly as needed for Muscle Spasm(s). diclofenac EC 75 mg EC tablet Commonly known as:  VOLTAREN Take  by mouth. hydroxychloroquine 200 mg tablet Commonly known as:  PLAQUENIL Take 200 mg by mouth daily. ipratropium 0.06 % nasal spray Commonly known as:  ATROVENT  
2 Sprays by Both Nostrils route four (4) times daily. ketotifen 0.025 % (0.035 %) ophthalmic solution Commonly known as:  ZADITOR Administer 1 Drop to both eyes two (2) times a day. levothyroxine 112 mcg tablet Commonly known as:  SYNTHROID Take 1 Tab by mouth Daily (before breakfast). SYNTHROID brand name medically necessary  
  
 loratadine 10 mg tablet Commonly known as:  CLARITIN  
TAKE 1 TAB BY MOUTH DAILY. * MOBIC PO Take  by mouth as needed. * meloxicam 15 mg tablet Commonly known as:  MOBIC  
TAKE 1 TABLET BY MOUTH EVERY DAY WITH FOOD AS NEEDED  
  
 montelukast 10 mg tablet Commonly known as:  SINGULAIR Take 1 Tab by mouth daily. Omeprazole delayed release 20 mg tablet Commonly known as:  PRILOSEC D/R Take 1 Tab by mouth daily. Peak Flow Meter Yuridia Use as directed  
  
 triamterene-hydroCHLOROthiazide 37.5-25 mg per capsule Commonly known as:  Janeal Mcardle Take 1 Cap by mouth daily. ZYRTEC PO Take  by mouth as needed. * Notice:   This list has 4 medication(s) that are the same as other medications prescribed for you. Read the directions carefully, and ask your doctor or other care provider to review them with you. We Performed the Following AMB POC EKG ROUTINE W/ 12 LEADS, INTER & REP [14381 CPT(R)] To-Do List   
 Around 07/14/2017 ECHO:  2D ECHO COMPLETE ADULT (TTE) W OR WO CONTR Introducing [facility] SERVICES! Dear Randyadonis Shukla: Thank you for requesting a [employer] account. Our records indicate that you already have an active [employer] account. You can access your account anytime at [email or web address] Did you know that you can access your hospital and ER discharge instructions at any time in [employer]? You can also review all of your test results from your hospital stay or ER visit. Additional Information If you have questions, please visit the Frequently Asked Questions section of the [employer] website at [email or web address] Remember, [employer] is NOT to be used for urgent needs. For medical emergencies, dial 911. Now available from your iPhone and Android! Please provide this summary of care documentation to your next provider. Your primary care clinician is listed as Ramón Oliver. If you have any questions after today's visit, please call [phone].

## 2017-07-28 ENCOUNTER — CLINICAL SUPPORT (OUTPATIENT)
Dept: CARDIOLOGY CLINIC | Age: 57
End: 2017-07-28

## 2017-07-28 DIAGNOSIS — R60.9 EDEMA, UNSPECIFIED TYPE: ICD-10-CM

## 2017-07-28 DIAGNOSIS — I10 ESSENTIAL HYPERTENSION: ICD-10-CM

## 2017-08-04 NOTE — PROGRESS NOTES
Please call patient, ECHO normal, slightly enlarged aorta, reviewed CT scan from 5/2017 size is stable.

## 2017-09-02 DIAGNOSIS — Z76.0 MEDICINE REFILL: ICD-10-CM

## 2017-09-06 ENCOUNTER — OFFICE VISIT (OUTPATIENT)
Dept: INTERNAL MEDICINE CLINIC | Age: 57
End: 2017-09-06

## 2017-09-06 VITALS
TEMPERATURE: 98.1 F | HEIGHT: 66 IN | OXYGEN SATURATION: 97 % | BODY MASS INDEX: 35.34 KG/M2 | SYSTOLIC BLOOD PRESSURE: 114 MMHG | HEART RATE: 80 BPM | DIASTOLIC BLOOD PRESSURE: 70 MMHG | RESPIRATION RATE: 16 BRPM | WEIGHT: 219.9 LBS

## 2017-09-06 DIAGNOSIS — E78.00 HYPERCHOLESTEREMIA: Primary | ICD-10-CM

## 2017-09-06 DIAGNOSIS — E66.01 SEVERE OBESITY (BMI 35.0-35.9 WITH COMORBIDITY) (HCC): ICD-10-CM

## 2017-09-06 LAB
CHOLEST SERPL-MCNC: 166 MG/DL
HDLC SERPL-MCNC: 67 MG/DL
LDL CHOLESTEROL POC: 69 MG/DL
NON-HDL CHOLESTEROL, 011976: 99
TCHOL/HDL RATIO (POC): 2.5
TRIGL SERPL-MCNC: 148 MG/DL

## 2017-09-06 RX ORDER — TRIAMTERENE AND HYDROCHLOROTHIAZIDE 37.5; 25 MG/1; MG/1
CAPSULE ORAL
Qty: 90 CAP | Refills: 1 | Status: SHIPPED | OUTPATIENT
Start: 2017-09-06

## 2017-09-06 NOTE — PROGRESS NOTES
Chief Complaint   Patient presents with    Hypertension    Referral Follow Up     1. Have you been to the ER, urgent care clinic since your last visit? Hospitalized since your last visit? No    2. Have you seen or consulted any other health care providers outside of the [address] [place] [address] since your last visit? Include any pap smears or colon screening.  No

## 2017-09-06 NOTE — PROGRESS NOTES
Chief Complaint   Patient presents with    Hypertension    Referral Follow Up           Subjective:   Nimisha Ragsdale 49 y.o.  female with a  past medical history reviewed see below. comes in today c/o:severe pain She has stopped taking her plaqinel due to severe pain and did not discuss this with her rheumatologist yet either- she know she should have \"talked to her doctor before starting the medication\"    ROS: otherwise feeling generally well. All other systems reviewed and are negative      Current Outpatient Prescriptions   Medication Sig Dispense Refill    atorvastatin (LIPITOR) 20 mg tablet TAKE 1 TAB BY MOUTH DAILY. 90 Tab 1    cyclobenzaprine (FLEXERIL) 5 mg tablet Take 1 Tab by mouth nightly as needed for Muscle Spasm(s). 90 Tab 1    Omeprazole delayed release (PRILOSEC D/R) 20 mg tablet Take 1 Tab by mouth daily. 30 Tab 11    albuterol (PROVENTIL VENTOLIN) 2.5 mg /3 mL (0.083 %) nebulizer solution 1 vial Q 4-6 hrs as needed for cough 1 Package 0    albuterol (PROVENTIL HFA, VENTOLIN HFA, PROAIR HFA) 90 mcg/actuation inhaler Take 1 Puff by inhalation every six (6) hours as needed for Wheezing or Shortness of Breath (cough). 1 Inhaler 3    Peak Flow Meter negrita Use as directed 1 Device 0    CETIRIZINE HCL (ZYRTEC PO) Take  by mouth as needed.  levothyroxine (SYNTHROID) 112 mcg tablet Take 1 Tab by mouth Daily (before breakfast). SYNTHROID brand name medically necessary 90 Tab 3    ipratropium (ATROVENT) 0.06 % nasal spray 2 Sprays by Both Nostrils route four (4) times daily. 15 mL 11    hydroxychloroquine (PLAQUINIL) 200 mg tablet Take 200 mg by mouth daily.  triamterene-hydroCHLOROthiazide (DYAZIDE) 37.5-25 mg per capsule TAKE ONE CAPSULE BY MOUTH DAILY 90 Cap 1    meloxicam (MOBIC) 15 mg tablet TAKE 1 TABLET BY MOUTH EVERY DAY WITH FOOD AS NEEDED  3    montelukast (SINGULAIR) 10 mg tablet Take 1 Tab by mouth daily. 30 Tab 3    MELOXICAM (MOBIC PO) Take  by mouth as needed.  ketotifen (ZADITOR) 0.025 % (0.035 %) ophthalmic solution Administer 1 Drop to both eyes two (2) times a day.  diclofenac EC (VOLTAREN) 75 mg EC tablet Take  by mouth.  loratadine (CLARITIN) 10 mg tablet TAKE 1 TAB BY MOUTH DAILY.  90 Tab 3     Allergies   Allergen Reactions    Duragesic [Fentanyl] Nausea and Vomiting     Past Medical History:   Diagnosis Date    Arthritis     Asthma     H/O seasonal allergies 5/7/2013    Headache     Hypertension     Lupus ([address]     Other ill-defined conditions     Lupus    Thyroid disease      Past Surgical History:   Procedure Laterality Date    HX GYN      Hysterectomy-partial    HX GYN      C Section X1    HX ORTHOPAEDIC      left shoulder surgery, right hand surgery 12/9/2016     Family History   Problem Relation Age of Onset    Hypertension Mother     Heart Disease Mother     Stroke Mother     Hypertension Sister     Heart Disease Sister     Stroke Sister     Heart Disease Brother     Stroke Brother     Hypertension Maternal Grandmother     Heart Disease Brother     Diabetes Sister     Diabetes Sister     Diabetes Brother     Kidney Disease Sister     Diabetes Sister     Hypertension Sister     No Known Problems Daughter     No Known Problems Daughter      Social History   Substance Use Topics    Smoking status: Never Smoker    Smokeless tobacco: Never Used    Alcohol use No          Objective:     Visit Vitals    /70 (BP 1 Location: Left arm, BP Patient Position: Sitting)    Pulse 80    Temp 98.1 °F (36.7 °C) (Oral)    Resp 16    Ht 5' 6\" (1.676 m)    Wt 219 lb 14.4 oz (99.7 kg)    SpO2 97%    BMI 35.49 kg/m2     Gen: NAD, pleasant  HEENT: eyes chol plaques noted  normal appearing head, nares patent, PERRLA, EOMI, oropharynx no erythema, no cervical lymphadenopathy neck supple   Cardio: RRR nl S1S2 no murmur  Lungs CTAB no wheeze no rales no rhonchi  ABD Soft non tender non distended + bowel sounds  Extremities: full ROM X 4 no clubbing no cyanosis  Neuro: no gross focal deficits noted, alert and orientated X 3  Psych.: well groomed no outward signs of depression. Assessment/Plan:   Diagnoses and all orders for this visit:    1. Hypercholesteremia  -     AMB POC LIPID PROFILE    2. Severe obesity (BMI 35.0-35.9 with comorbidity) (HCC)  -     AMB POC LIPID PROFILE    Other orders  -     atorvastatin (LIPITOR) 20 mg tablet; TAKE 1 TAB BY MOUTH DAILY. Follow-up Disposition: Not on File. did nto order labs as she is going to her rheumtomatlogist   avs printed and given to the pt. .    The patient voiced understanding of the above. Medication side effects were reviewed with the patient. Call with any concerns.

## 2017-09-09 RX ORDER — ATORVASTATIN CALCIUM 20 MG/1
TABLET, FILM COATED ORAL
Qty: 90 TAB | Refills: 1 | Status: SHIPPED | OUTPATIENT
Start: 2017-09-09

## 2017-12-28 ENCOUNTER — OFFICE VISIT (OUTPATIENT)
Dept: INTERNAL MEDICINE CLINIC | Age: 57
End: 2017-12-28

## 2017-12-28 VITALS
WEIGHT: 222 LBS | RESPIRATION RATE: 18 BRPM | DIASTOLIC BLOOD PRESSURE: 81 MMHG | TEMPERATURE: 97.7 F | HEIGHT: 66 IN | SYSTOLIC BLOOD PRESSURE: 154 MMHG | HEART RATE: 93 BPM | BODY MASS INDEX: 35.68 KG/M2 | OXYGEN SATURATION: 97 %

## 2017-12-28 DIAGNOSIS — I10 ESSENTIAL HYPERTENSION: ICD-10-CM

## 2017-12-28 DIAGNOSIS — J45.20 MILD INTERMITTENT ASTHMA WITHOUT COMPLICATION: ICD-10-CM

## 2017-12-28 DIAGNOSIS — E66.9 OBESITY (BMI 35.0-39.9 WITHOUT COMORBIDITY): ICD-10-CM

## 2017-12-28 DIAGNOSIS — Z12.39 BREAST CANCER SCREENING: ICD-10-CM

## 2017-12-28 DIAGNOSIS — Z00.00 WELL ADULT EXAM: Primary | ICD-10-CM

## 2017-12-28 DIAGNOSIS — E03.8 OTHER SPECIFIED HYPOTHYROIDISM: ICD-10-CM

## 2017-12-28 DIAGNOSIS — M32.13 OTHER SYSTEMIC LUPUS ERYTHEMATOSUS WITH LUNG INVOLVEMENT (HCC): ICD-10-CM

## 2017-12-28 RX ORDER — LEVOTHYROXINE SODIUM 112 UG/1
112 TABLET ORAL
Qty: 90 TAB | Refills: 3 | Status: SHIPPED | OUTPATIENT
Start: 2017-12-28

## 2017-12-28 NOTE — PROGRESS NOTES
Pola Awad is a 56 y.o. female and presents with New Patient; Lupus; and Medication Refill  . Subjective:    New patient. Former Dr. Rosales Comes patient. PMH:  SLE-quiescent.  NOT on any medication  PATRICA, mild-NOT using CPAP  HTN-pt relays she has NOT taken her bp medication as yet today  BP Readings from Last 3 Encounters:   12/28/17 154/81   09/06/17 114/70   07/13/17 114/90     Asthma-quiescent    Hypothyroidism/thyroid nodule-last thyroid sono 5/28/13   -pt will be doing labs through her rheumatologist-located in [facility]  Lab Results   Component Value Date/Time    TSH 4.930 11/16/2016 08:54 AM     Hyperlipidemia-on atorvastatin 20mg daily  Lab Results   Component Value Date/Time    Cholesterol, total 165 11/16/2016 08:54 AM    Cholesterol (POC) 166 09/06/2017 09:25 AM    HDL Cholesterol 68 11/16/2016 08:54 AM    HDL Cholesterol (POC) 67 09/06/2017 09:25 AM    LDL,Direct 143 05/11/2016 10:47 AM    LDL Cholesterol (POC) 69 09/06/2017 09:25 AM    LDL, calculated 82 11/16/2016 08:54 AM    VLDL, calculated 15 11/16/2016 08:54 AM    Triglyceride 73 11/16/2016 08:54 AM    Triglycerides (POC) 148 09/06/2017 09:25 AM       Review of Systems  Constitutional: negative for fevers, chills, anorexia and weight loss  Respiratory:  negative for cough, hemoptysis, dyspnea,wheezing  CV:   negative for chest pain, palpitations, lower extremity edema  GI:   negative for nausea, vomiting, diarrhea, abdominal pain,melena  Musculoskel: negative for myalgias, arthralgias, back pain, muscle weakness, joint pain  Neurological:  negative for headaches, dizziness, vertigo, memory problems and gait   Behavl/Psych: negative for feelings of anxiety, depression, mood changes    Past Medical History:   Diagnosis Date    Arthritis     Asthma     H/O seasonal allergies 5/7/2013    Headache     Hypertension     Lupus     Other ill-defined conditions(809.89)     Lupus    Thyroid disease      Past Surgical History:   Procedure Laterality Date    HX GYN      Hysterectomy-partial    HX GYN      C Section X1    HX ORTHOPAEDIC      left shoulder surgery, right hand surgery 12/9/2016     Social History     Social History    Marital status:      Spouse name: ladonna isaac give info    Number of children: 2    Years of education: N/A     Occupational History     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      Social History Main Topics    Smoking status: Never Smoker    Smokeless tobacco: Never Used    Alcohol use No    Drug use: No      Comment: denies     Sexual activity: Yes     Partners: Male     Birth control/ protection: None     Other Topics Concern    None     Social History Narrative     Family History   Problem Relation Age of Onset    Hypertension Mother     Heart Disease Mother     Stroke Mother     Hypertension Sister     Heart Disease Sister     Stroke Sister     Heart Disease Brother     Stroke Brother     Hypertension Maternal Grandmother     Heart Disease Brother     Diabetes Sister     Diabetes Sister     Diabetes Brother     Kidney Disease Sister     Diabetes Sister     Hypertension Sister     No Known Problems Daughter     No Known Problems Daughter      Current Outpatient Prescriptions   Medication Sig Dispense Refill    levothyroxine (SYNTHROID) 112 mcg tablet Take 1 Tab by mouth Daily (before breakfast). SYNTHROID brand name medically necessary 90 Tab 3    atorvastatin (LIPITOR) 20 mg tablet TAKE 1 TAB BY MOUTH DAILY. 90 Tab 1    triamterene-hydroCHLOROthiazide (DYAZIDE) 37.5-25 mg per capsule TAKE ONE CAPSULE BY MOUTH DAILY 90 Cap 1    Omeprazole delayed release (PRILOSEC D/R) 20 mg tablet Take 1 Tab by mouth daily.  30 Tab 11    albuterol (PROVENTIL VENTOLIN) 2.5 mg /3 mL (0.083 %) nebulizer solution 1 vial Q 4-6 hrs as needed for cough 1 Package 0    albuterol (PROVENTIL HFA, VENTOLIN HFA, PROAIR HFA) 90 mcg/actuation inhaler Take 1 Puff by inhalation every six (6) hours as needed for Wheezing or Shortness of Breath (cough). 1 Inhaler 3    CETIRIZINE HCL (ZYRTEC PO) Take  by mouth as needed.  ipratropium (ATROVENT) 0.06 % nasal spray 2 Sprays by Both Nostrils route four (4) times daily. 15 mL 11     Allergies   Allergen Reactions    Duragesic [Fentanyl] Nausea and Vomiting       Objective:  Visit Vitals    /81 (BP 1 Location: Left arm, BP Patient Position: Sitting)    Pulse 93    Temp 97.7 °F (36.5 °C) (Oral)    Resp 18    Ht 5' 6\" (1.676 m)    Wt 222 lb (100.7 kg)    SpO2 97%    BMI 35.83 kg/m2     Physical Exam:   General appearance - alert, well appearing, and in no distress pleasant  Mental status - alert, oriented to person, place, and time  EYE-CAMELIA, EOMI, corneas normal, no foreign bodies  ENT-ENT exam normal, no neck nodes or sinus tenderness  Mouth - mucous membranes moist, pharynx normal without lesions  Neck - supple, no significant adenopathy   Chest - clear to auscultation, no wheezes, rales or rhonchi, symmetric air entry   Heart - normal rate, regular rhythm, normal S1, S2, 2/6 systolic murmur  Abdomen - soft, nontender, nondistended, obese  Ext-peripheral pulses normal, no pedal edema, no clubbing or cyanosis  Skin-Warm and dry. no hyperpigmentation, vitiligo, or suspicious lesions  Neuro -alert, oriented, normal speech, no focal findings or movement disorder noted        Results for orders placed or performed in visit on 09/06/17   AMB POC LIPID PROFILE   Result Value Ref Range    Cholesterol (POC) 166     Triglycerides (POC) 148     HDL Cholesterol (POC) 67     Non-HDL Cholesterol 99     LDL Cholesterol (POC) 69 MG/DL    TChol/HDL Ratio (POC) 2.5        Assessment/Plan:    ICD-10-CM ICD-9-CM    1. Well adult exam Z00.00 V70.0    2. Essential hypertension I10 401.9    3. Other specified hypothyroidism E03.8 244.8 US THYROID/PARATHYROID/SOFT TISS      levothyroxine (SYNTHROID) 112 mcg tablet   4.  Other systemic lupus erythematosus with lung involvement ([facility] M32.13 710.0 517.8    5. Mild intermittent asthma without complication M75.33 397.97    6. Breast cancer screening Z12.31 V76.10 [facility] MAMMO BI SCREENING INCL CAD   7. Obesity (BMI 35.0-39.9 without comorbidity) E66.9 278.00      Orders Placed This Encounter    US THYROID/PARATHYROID/SOFT TISS     Standing Status:   Future     Standing Expiration Date:   1/28/2019    [facility] MAMMO BI SCREENING INCL CAD     Standing Status:   Future     Standing Expiration Date:   1/28/2019     Order Specific Question:   Reason for Exam     Answer:   breast cancer screening    levothyroxine (SYNTHROID) 112 mcg tablet     Sig: Take 1 Tab by mouth Daily (before breakfast). SYNTHROID brand name medically necessary     Dispense:  90 Tab     Refill:  3     lose weight, increase physical activity, follow low salt diet, have labs drawn prior to ROV  Patient Instructions        Low Sodium Diet (2,000 Milligram): Care Instructions  Your Care Instructions    Too much sodium causes your body to hold on to extra water. This can raise your blood pressure and force your heart and kidneys to work harder. In very serious cases, this could cause you to be put in the hospital. It might even be life-threatening. By limiting sodium, you will feel better and lower your risk of serious problems. The most common source of sodium is salt. People get most of the salt in their diet from canned, prepared, and packaged foods. Fast food and restaurant meals also are very high in sodium. Your doctor will probably limit your sodium to less than 2,000 milligrams (mg) a day. This limit counts all the sodium in prepared and packaged foods and any salt you add to your food. Follow-up care is a key part of your treatment and safety. Be sure to make and go to all appointments, and call your doctor if you are having problems. It's also a good idea to know your test results and keep a list of the medicines you take. How can you care for yourself at home?   Read food labels  · Read labels on cans and food packages. The labels tell you how much sodium is in each serving. Make sure that you look at the serving size. If you eat more than the serving size, you have eaten more sodium. · Food labels also tell you the Percent Daily Value for sodium. Choose products with low Percent Daily Values for sodium. · Be aware that sodium can come in forms other than salt, including monosodium glutamate (MSG), sodium citrate, and sodium bicarbonate (baking soda). MSG is often added to Asian food. When you eat out, you can sometimes ask for food without MSG or added salt. Buy low-sodium foods  · Buy foods that are labeled \"unsalted\" (no salt added), \"sodium-free\" (less than 5 mg of sodium per serving), or \"low-sodium\" (less than 140 mg of sodium per serving). Foods labeled \"reduced-sodium\" and \"light sodium\" may still have too much sodium. Be sure to read the label to see how much sodium you are getting. · Buy fresh vegetables, or frozen vegetables without added sauces. Buy low-sodium versions of canned vegetables, soups, and other canned goods. Prepare low-sodium meals  · Cut back on the amount of salt you use in cooking. This will help you adjust to the taste. Do not add salt after cooking. One teaspoon of salt has about 2,300 mg of sodium. · Take the salt shaker off the table. · Flavor your food with garlic, lemon juice, onion, vinegar, herbs, and spices. Do not use soy sauce, lite soy sauce, steak sauce, onion salt, garlic salt, celery salt, mustard, or ketchup on your food. · Use low-sodium salad dressings, sauces, and ketchup. Or make your own salad dressings and sauces without adding salt. · Use less salt (or none) when recipes call for it. You can often use half the salt a recipe calls for without losing flavor. Other foods such as rice, pasta, and grains do not need added salt. · Rinse canned vegetables, and cook them in fresh water. This removes some-but not all-of the salt.   · Avoid water that is naturally high in sodium or that has been treated with water softeners, which add sodium. Call your local water company to find out the sodium content of your water supply. If you buy bottled water, read the label and choose a sodium-free brand. Avoid high-sodium foods  · Avoid eating:  ¨ Smoked, cured, salted, and canned meat, fish, and poultry. ¨ Ham, covarrubias, hot dogs, and luncheon meats. ¨ Regular, hard, and processed cheese and regular peanut butter. ¨ Crackers with salted tops, and other salted snack foods such as pretzels, chips, and salted popcorn. ¨ Frozen prepared meals, unless labeled low-sodium. ¨ Canned and dried soups, broths, and bouillon, unless labeled sodium-free or low-sodium. ¨ Canned vegetables, unless labeled sodium-free or low-sodium. ¨ Western Sarahy fries, pizza, tacos, and other fast foods. ¨ Pickles, olives, ketchup, and other condiments, especially soy sauce, unless labeled sodium-free or low-sodium. Where can you learn more? Go to http://eris-lalito.info/. Enter M800 in the search box to learn more about \"Low Sodium Diet (2,000 Milligram): Care Instructions. \"  Current as of: May 12, 2017  Content Version: 11.4  © 8630-7555 [employer]. Care instructions adapted under license by [employer] (which disclaims liability or warranty for this information). If you have questions about a medical condition or this instruction, always ask your healthcare professional. [place] [address] any warranty or liability for your use of this information. Follow-up Disposition:  Return in about 8 weeks (around 2/22/2018) for bp check. I have reviewed with the patient details of the assessment and plan and all questions were answered. Relevent patient education was performed. The most recent lab findings were reviewed with the patient. An After Visit Summary was printed and given to the patient.

## 2017-12-28 NOTE — MR AVS SNAPSHOT
Visit Information Date & Time Provider Department Dept. Phone Encounter #  
 12/28/2017  8:00 AM Vlad Batista, [address]  [id number] Follow-up Instructions Return in about 8 weeks (around 2/22/2018) for bp check. Upcoming Health Maintenance Date Due Hepatitis C Screening 1960 BREAST CANCER SCRN MAMMOGRAM 4/16/2017 PAP AKA CERVICAL CYTOLOGY 9/16/2019 DTaP/Tdap/Td series (2 - Td) 12/21/2025 COLONOSCOPY 3/8/2026 Allergies as of 12/28/2017  Review Complete On: 12/28/2017 By: Adilene Beaulieu LPN Severity Noted Reaction Type Reactions Duragesic [Fentanyl]  06/07/2013    Nausea and Vomiting Current Immunizations  Reviewed on 1/23/2017 Name Date Tdap 12/21/2015 Not reviewed this visit You Were Diagnosed With   
  
 Codes Comments Other specified hypothyroidism    -  Primary ICD-10-CM: E03.8 ICD-9-CM: 244.8 Medicine refill     ICD-10-CM: Z76.0 ICD-9-CM: V68.1 Breast cancer screening     ICD-10-CM: Z12.31 
ICD-9-CM: V76.10 Vitals BP Pulse Temp Resp Height(growth percentile) Weight(growth percentile) 154/81 (BP 1 Location: Left arm, BP Patient Position: Sitting) 93 97.7 °F (36.5 °C) (Oral) 18 5' 6\" (1.676 m) 222 lb (100.7 kg) SpO2 BMI OB Status Smoking Status 97% 35.83 kg/m2 Hysterectomy Never Smoker Vitals History BMI and BSA Data Body Mass Index Body Surface Area  
 35.83 kg/m 2 2.17 m 2 Preferred Pharmacy Pharmacy Name Phone [id number] - [address] [phone] Your Updated Medication List  
  
   
This list is accurate as of: 12/28/17  9:25 AM.  Always use your most recent med list.  
  
  
  
  
 * albuterol 2.5 mg /3 mL (0.083 %) nebulizer solution Commonly known as:  PROVENTIL VENTOLIN  
1 vial Q 4-6 hrs as needed for cough * albuterol 90 mcg/actuation inhaler Commonly known as:  PROVENTIL HFA, VENTOLIN HFA, PROAIR HFA Take 1 Puff by inhalation every six (6) hours as needed for Wheezing or Shortness of Breath (cough). atorvastatin 20 mg tablet Commonly known as:  LIPITOR  
TAKE 1 TAB BY MOUTH DAILY. ipratropium 42 mcg (0.06 %) nasal spray Commonly known as:  ATROVENT  
2 Sprays by Both Nostrils route four (4) times daily. levothyroxine 112 mcg tablet Commonly known as:  SYNTHROID Take 1 Tab by mouth Daily (before breakfast). SYNTHROID brand name medically necessary Omeprazole delayed release 20 mg tablet Commonly known as:  PRILOSEC D/R Take 1 Tab by mouth daily. triamterene-hydroCHLOROthiazide 37.5-25 mg per capsule Commonly known as:  [place]clay Mends TAKE ONE CAPSULE BY MOUTH DAILY  
  
 ZYRTEC PO Take  by mouth as needed. * Notice: This list has 2 medication(s) that are the same as other medications prescribed for you. Read the directions carefully, and ask your doctor or other care provider to review them with you. Prescriptions Sent to Pharmacy Refills  
 levothyroxine (SYNTHROID) 112 mcg tablet 3 Sig: Take 1 Tab by mouth Daily (before breakfast). SYNTHROID brand name medically necessary Class: Normal  
 Pharmacy: [facility]/pharmacy [address] [place] [address] [address] #: [phone] Route: Oral  
  
Follow-up Instructions Return in about 8 weeks (around 2/22/2018) for bp check. To-Do List   
 12/29/2017 Imaging:  [facility] MAMMO BI SCREENING INCL CAD   
  
 12/29/2017 Imaging:  US THYROID/PARATHYROID/SOFT TISS Patient Instructions Low Sodium Diet (2,000 Milligram): Care Instructions Your Care Instructions Too much sodium causes your body to hold on to extra water. This can raise your blood pressure and force your heart and kidneys to work harder.  In very serious cases, this could cause you to be put in the hospital. It might even be life-threatening. By limiting sodium, you will feel better and lower your risk of serious problems. The most common source of sodium is salt. People get most of the salt in their diet from canned, prepared, and packaged foods. Fast food and restaurant meals also are very high in sodium. Your doctor will probably limit your sodium to less than 2,000 milligrams (mg) a day. This limit counts all the sodium in prepared and packaged foods and any salt you add to your food. Follow-up care is a key part of your treatment and safety. Be sure to make and go to all appointments, and call your doctor if you are having problems. It's also a good idea to know your test results and keep a list of the medicines you take. How can you care for yourself at home? Read food labels · Read labels on cans and food packages. The labels tell you how much sodium is in each serving. Make sure that you look at the serving size. If you eat more than the serving size, you have eaten more sodium. · Food labels also tell you the Percent Daily Value for sodium. Choose products with low Percent Daily Values for sodium. · Be aware that sodium can come in forms other than salt, including monosodium glutamate (MSG), sodium citrate, and sodium bicarbonate (baking soda). MSG is often added to Asian food. When you eat out, you can sometimes ask for food without MSG or added salt. Buy low-sodium foods · Buy foods that are labeled \"unsalted\" (no salt added), \"sodium-free\" (less than 5 mg of sodium per serving), or \"low-sodium\" (less than 140 mg of sodium per serving). Foods labeled \"reduced-sodium\" and \"light sodium\" may still have too much sodium. Be sure to read the label to see how much sodium you are getting. · Buy fresh vegetables, or frozen vegetables without added sauces. Buy low-sodium versions of canned vegetables, soups, and other canned goods. Prepare low-sodium meals · Cut back on the amount of salt you use in cooking. This will help you adjust to the taste. Do not add salt after cooking. One teaspoon of salt has about 2,300 mg of sodium. · Take the salt shaker off the table. · Flavor your food with garlic, lemon juice, onion, vinegar, herbs, and spices. Do not use soy sauce, lite soy sauce, steak sauce, onion salt, garlic salt, celery salt, mustard, or ketchup on your food. · Use low-sodium salad dressings, sauces, and ketchup. Or make your own salad dressings and sauces without adding salt. · Use less salt (or none) when recipes call for it. You can often use half the salt a recipe calls for without losing flavor. Other foods such as rice, pasta, and grains do not need added salt. · Rinse canned vegetables, and cook them in fresh water. This removes some-but not all-of the salt. · Avoid water that is naturally high in sodium or that has been treated with water softeners, which add sodium. Call your local water company to find out the sodium content of your water supply. If you buy bottled water, read the label and choose a sodium-free brand. Avoid high-sodium foods · Avoid eating: ¨ Smoked, cured, salted, and canned meat, fish, and poultry. ¨ Ham, covarrubias, hot dogs, and luncheon meats. ¨ Regular, hard, and processed cheese and regular peanut butter. ¨ Crackers with salted tops, and other salted snack foods such as pretzels, chips, and salted popcorn. ¨ Frozen prepared meals, unless labeled low-sodium. ¨ Canned and dried soups, broths, and bouillon, unless labeled sodium-free or low-sodium. ¨ Canned vegetables, unless labeled sodium-free or low-sodium. ¨ Western Sarahy fries, pizza, tacos, and other fast foods. ¨ Pickles, olives, ketchup, and other condiments, especially soy sauce, unless labeled sodium-free or low-sodium. Where can you learn more? Go to http://bruno.info/. Enter R078 in the search box to learn more about \"Low Sodium Diet (2,000 Milligram): Care Instructions. \" Current as of: May 12, 2017 Content Version: 11.4 © 7318-1889 [employer]. Care instructions adapted under license by [employer] (which disclaims liability or warranty for this information). If you have questions about a medical condition or this instruction, always ask your healthcare professional. [place] [address] any warranty or liability for your use of this information. Introducing [facility] [place] [facility]! Dear Shruthi Goldberg: Thank you for requesting a [email or web address] account. Our records indicate that you already have an active [email or web address] account. You can access your account anytime at [email or web address] Did you know that you can access your hospital and ER discharge instructions at any time in [email or web address]? You can also review all of your test results from your hospital stay or ER visit. Additional Information If you have questions, please visit the Frequently Asked Questions section of the [email or web address] website at [email or web address] Remember, [email or web address] is NOT to be used for urgent needs. For medical emergencies, dial 911. Now available from your iPhone and Android! Please provide this summary of care documentation to your next provider. Your primary care clinician is listed as Mike Age. If you have any questions after today's visit, please call [phone].

## 2017-12-28 NOTE — PATIENT INSTRUCTIONS

## 2017-12-28 NOTE — PROGRESS NOTES
1. Have you been to the ER, urgent care clinic since your last visit? Hospitalized since your last visit? No    2. Have you seen or consulted any other health care providers outside of the [address] [place] [address] since your last visit? Include any pap smears or colon screening.  No.

## 2018-01-22 ENCOUNTER — HOSPITAL ENCOUNTER (OUTPATIENT)
Dept: ULTRASOUND IMAGING | Age: 58
Discharge: HOME | End: 2018-01-22
Payer: COMMERCIAL

## 2018-01-22 ENCOUNTER — HOSPITAL ENCOUNTER (OUTPATIENT)
Dept: MAMMOGRAPHY | Age: 58
Discharge: HOME | End: 2018-01-22
Payer: COMMERCIAL

## 2018-01-22 DIAGNOSIS — Z12.39 BREAST CANCER SCREENING: ICD-10-CM

## 2018-01-22 DIAGNOSIS — E03.8 OTHER SPECIFIED HYPOTHYROIDISM: ICD-10-CM

## 2018-01-22 PROCEDURE — 76536 US EXAM OF HEAD AND NECK: CPT

## 2018-01-22 PROCEDURE — 77067 SCR MAMMO BI INCL CAD: CPT

## 2018-02-22 ENCOUNTER — OFFICE VISIT (OUTPATIENT)
Dept: INTERNAL MEDICINE CLINIC | Age: 58
End: 2018-02-22

## 2018-02-22 VITALS
TEMPERATURE: 97.2 F | BODY MASS INDEX: 34.92 KG/M2 | OXYGEN SATURATION: 82 % | SYSTOLIC BLOOD PRESSURE: 128 MMHG | WEIGHT: 217.3 LBS | HEIGHT: 66 IN | RESPIRATION RATE: 18 BRPM | DIASTOLIC BLOOD PRESSURE: 79 MMHG | HEART RATE: 82 BPM

## 2018-02-22 DIAGNOSIS — E03.8 OTHER SPECIFIED HYPOTHYROIDISM: ICD-10-CM

## 2018-02-22 DIAGNOSIS — I10 ESSENTIAL HYPERTENSION: Primary | ICD-10-CM

## 2018-02-22 DIAGNOSIS — K21.9 GASTROESOPHAGEAL REFLUX DISEASE, ESOPHAGITIS PRESENCE NOT SPECIFIED: ICD-10-CM

## 2018-02-22 DIAGNOSIS — M32.13 OTHER SYSTEMIC LUPUS ERYTHEMATOSUS WITH LUNG INVOLVEMENT (HCC): ICD-10-CM

## 2018-02-22 RX ORDER — PHENOL/SODIUM PHENOLATE
20 AEROSOL, SPRAY (ML) MUCOUS MEMBRANE
Qty: 30 TAB | Refills: 5 | Status: SHIPPED | OUTPATIENT
Start: 2018-02-22

## 2018-02-22 NOTE — PROGRESS NOTES
Izaiah Monson is a 56 y.o. female and presents with Hypertension  . Subjective:      Pt comes-in for bp check on med. BP Readings from Last 3 Encounters:   02/22/18 128/79   12/28/17 154/81   09/06/17 114/70         Pt w c/o GERD and intermitt brbpr due to known hemorrhoids. She does NOT suffer with constipation. Colonoscopy 2016 nml. Pt requests a refill of her PPI. Pt is concerned bc her her rheumatologist would like to start her on an immune modulating injection bc her plaquanil is no longer working. TSH nml on labs done through rheum  Review of Systems  Constitutional: negative for fevers, chills, anorexia and weight loss  Eyes:   negative for visual disturbance and irritation  ENT:   negative for tinnitus,sore throat,nasal congestion,ear pains. hoarseness  Respiratory:  negative for cough, hemoptysis, dyspnea,wheezing  CV:   negative for chest pain, palpitations, lower extremity edema  GI:   negative for nausea, vomiting, diarrhea, abdominal pain +BRBPR and heartburn @ night  Musculoskel: negative for myalgias, arthralgias, back pain, muscle weakness, joint pain  Neurological:  negative for headaches, dizziness, vertigo, memory problems and gait   Behavl/Psych: negative for feelings of anxiety, depression, mood changes    Past Medical History:   Diagnosis Date    Arthritis     Asthma     H/O seasonal allergies 5/7/2013    Headache     Hypertension     Lupus     Other ill-defined conditions(799.89)     Lupus    Thyroid disease      Past Surgical History:   Procedure Laterality Date    HX GYN      Hysterectomy-partial    HX GYN      C Section X1    HX ORTHOPAEDIC      left shoulder surgery, right hand surgery 12/9/2016     Social History     Social History    Marital status:      Spouse name: ladonna neal to give info    Number of children: 2    Years of education: N/A     Occupational History     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      Social History Main Topics    Smoking status: Never Smoker  Smokeless tobacco: Never Used    Alcohol use No    Drug use: No      Comment: denies     Sexual activity: Yes     Partners: Male     Birth control/ protection: None     Other Topics Concern    None     Social History Narrative     Family History   Problem Relation Age of Onset    Hypertension Mother     Heart Disease Mother     Stroke Mother     Hypertension Sister     Heart Disease Sister     Stroke Sister     Heart Disease Brother     Stroke Brother     Hypertension Maternal Grandmother     Heart Disease Brother     Diabetes Sister     Diabetes Sister     Diabetes Brother     Kidney Disease Sister     Diabetes Sister     Hypertension Sister     No Known Problems Daughter     No Known Problems Daughter     Breast Cancer Sister 68     Current Outpatient Prescriptions   Medication Sig Dispense Refill    Omeprazole delayed release (PRILOSEC D/R) 20 mg tablet Take 1 Tab by mouth daily as needed. 30 Tab 5    levothyroxine (SYNTHROID) 112 mcg tablet Take 1 Tab by mouth Daily (before breakfast). SYNTHROID brand name medically necessary 90 Tab 3    atorvastatin (LIPITOR) 20 mg tablet TAKE 1 TAB BY MOUTH DAILY. 90 Tab 1    triamterene-hydroCHLOROthiazide (DYAZIDE) 37.5-25 mg per capsule TAKE ONE CAPSULE BY MOUTH DAILY 90 Cap 1    albuterol (PROVENTIL VENTOLIN) 2.5 mg /3 mL (0.083 %) nebulizer solution 1 vial Q 4-6 hrs as needed for cough 1 Package 0    albuterol (PROVENTIL HFA, VENTOLIN HFA, PROAIR HFA) 90 mcg/actuation inhaler Take 1 Puff by inhalation every six (6) hours as needed for Wheezing or Shortness of Breath (cough). 1 Inhaler 3    CETIRIZINE HCL (ZYRTEC PO) Take  by mouth as needed.  ipratropium (ATROVENT) 0.06 % nasal spray 2 Sprays by Both Nostrils route four (4) times daily.  15 mL 11     Allergies   Allergen Reactions    Duragesic [Fentanyl] Nausea and Vomiting       Objective:  Visit Vitals    /79 (BP 1 Location: Left arm, BP Patient Position: Sitting)    Pulse 82    Temp 97.2 °F (36.2 °C) (Oral)    Resp 18    Ht 5' 6\" (1.676 m)    Wt 217 lb 4.8 oz (98.6 kg)    SpO2 (!) 82%    BMI 35.07 kg/m2     Physical Exam:   General appearance - alert, pleasant  Mental status - alert, oriented to person, place, and time  EYE-EOMI  Neck - supple,   Chest - symmetric air entry    Abdomen - obese  Ext-no pedal edema, no clubbing or cyanosis  Skin-Warm and dry. no hyperpigmentation, vitiligo, or suspicious lesions  Neuro -alert, oriented, normal speech, no focal findings or movement disorder noted      Results for orders placed or performed in visit on 09/06/17   AMB POC LIPID PROFILE   Result Value Ref Range    Cholesterol (POC) 166     Triglycerides (POC) 148     HDL Cholesterol (POC) 67     Non-HDL Cholesterol 99     LDL Cholesterol (POC) 69 MG/DL    TChol/HDL Ratio (POC) 2.5        Assessment/Plan:    ICD-10-CM ICD-9-CM    1. Essential hypertension I10 401.9    2. Gastroesophageal reflux disease, esophagitis presence not specified K21.9 530.81    3. Other systemic lupus erythematosus with lung involvement ([facility] [address] M32.13 710.0      517.8    4. Other specified hypothyroidism E03.8 244.8      Orders Placed This Encounter    Omeprazole delayed release (PRILOSEC D/R) 20 mg tablet     Sig: Take 1 Tab by mouth daily as needed. Dispense:  30 Tab     Refill:  5     continue present plan  D/w pt behavioral changes to decrease GERD sxs  F/u 4 mths/prn  There are no Patient Instructions on file for this visit. Follow-up Disposition:  Return in about 4 months (around 6/22/2018) for bp check. I have reviewed with the patient details of the assessment and plan and all questions were answered. Relevent patient education was performed. The most recent lab findings were reviewed with the patient. An After Visit Summary was printed and given to the patient.

## 2018-02-22 NOTE — PROGRESS NOTES
1. Have you been to the ER, urgent care clinic since your last visit? Hospitalized since your last visit? No    2. Have you seen or consulted any other health care providers outside of the [address] [place] [address] since your last visit? Include any pap smears or colon screening.  Yes When: Dr. Kuldip Plascencia

## 2018-12-13 ENCOUNTER — HOSPITAL ENCOUNTER (OUTPATIENT)
Dept: GENERAL RADIOLOGY | Age: 58
Discharge: HOME | End: 2018-12-13
Payer: COMMERCIAL

## 2018-12-13 ENCOUNTER — OFFICE VISIT (OUTPATIENT)
Dept: INTERNAL MEDICINE CLINIC | Age: 58
End: 2018-12-13

## 2018-12-13 VITALS
WEIGHT: 217 LBS | HEART RATE: 92 BPM | RESPIRATION RATE: 16 BRPM | SYSTOLIC BLOOD PRESSURE: 119 MMHG | DIASTOLIC BLOOD PRESSURE: 79 MMHG | BODY MASS INDEX: 34.87 KG/M2 | TEMPERATURE: 98.1 F | HEIGHT: 66 IN | OXYGEN SATURATION: 100 %

## 2018-12-13 DIAGNOSIS — M54.10 RADICULAR PAIN: ICD-10-CM

## 2018-12-13 DIAGNOSIS — M54.10 RADICULAR PAIN: Primary | ICD-10-CM

## 2018-12-13 PROCEDURE — 72100 X-RAY EXAM L-S SPINE 2/3 VWS: CPT

## 2018-12-13 RX ORDER — IBUPROFEN 800 MG/1
800 TABLET ORAL
Qty: 45 TAB | Refills: 1 | Status: SHIPPED | OUTPATIENT
Start: 2018-12-13

## 2018-12-13 NOTE — PROGRESS NOTES
Shane Huerta is a 66 y.o. female  Chief Complaint   Patient presents with   [facility] Follow Up     Pt is here for a hospital f/u from 09 Dec 2018.  Back Pain     Pt c/o lower back pain that radiates to left buttocks starting 08 Dec 2018. Pt denies any trauma. 1. Have you been to the ER, urgent care clinic since your last visit? Hospitalized since your last visit? [facility] 09 [facility] 2018. 2. Have you seen or consulted any other health care providers outside of the [address] [place] [address] since your last visit? Include any pap smears or colon screening. No     Health Maintenance Due   Topic Date Due    Hepatitis C Screening  1960    Shingrix Vaccine Age 50> (1 of 2) 10/14/2012    Influenza Age 5 to Adult  08/01/2018    BREAST CANCER SCRN MAMMOGRAM  01/22/2019   Pt refuses Influenza vaccination.      Visit Vitals  /79 (BP 1 Location: Left arm, BP Patient Position: Sitting)   Pulse 92   Temp 98.1 °F (36.7 °C) (Oral)   Resp 16   Ht 5' 6\" (1.676 m)   Wt 217 lb (98.4 kg)   SpO2 100%   BMI 35.02 kg/m²

## 2018-12-13 NOTE — PATIENT INSTRUCTIONS
Sciatica: Care Instructions  Your Care Instructions    Sciatica (say \"gmc-DH-ia-kuh\") is an irritation of one of the sciatic nerves, which come from the spinal cord in the lower back. The sciatic nerves and their branches extend down through the buttock to the foot. Sciatica can develop when an injured disc in the back presses against a spinal nerve root. Its main symptom is pain, numbness, or weakness that is often worse in the leg or foot than in the back. Sciatica often will improve and go away with time. Early treatment usually includes medicines and exercises to relieve pain. Follow-up care is a key part of your treatment and safety. Be sure to make and go to all appointments, and call your doctor if you are having problems. It's also a good idea to know your test results and keep a list of the medicines you take. How can you care for yourself at home? · Take pain medicines exactly as directed. ? If the doctor gave you a prescription medicine for pain, take it as prescribed. ? If you are not taking a prescription pain medicine, ask your doctor if you can take an over-the-counter medicine. · Use heat or ice to relieve pain. ? To apply heat, put a warm water bottle, heating pad set on low, or warm cloth on your back. Do not go to sleep with a heating pad on your skin. ? To use ice, put ice or a cold pack on the area for 10 to 20 minutes at a time. Put a thin cloth between the ice and your skin. · Avoid sitting if possible, unless it feels better than standing. · Alternate lying down with short walks. Increase your walking distance as you are able to without making your symptoms worse. · Do not do anything that makes your symptoms worse. When should you call for help? Call 911 anytime you think you may need emergency care.  For example, call if:    · You are unable to move a leg at all.   [facility] your doctor now or seek immediate medical care if:    · You have new or worse symptoms in your legs or buttocks. Symptoms may include:  ? Numbness or tingling. ? Weakness. ? Pain.     · You lose bladder or bowel control.    Watch closely for changes in your health, and be sure to contact your doctor if:    · You are not getting better as expected. Where can you learn more? Go to http://eris-lalito.info/. Enter [phone] in the search box to learn more about \"Sciatica: Care Instructions. \"  Current as of: November 29, 2017  Content Version: 11.8  © 8864-2953 [employer]. Care instructions adapted under license by [employer] (which disclaims liability or warranty for this information). If you have questions about a medical condition or this instruction, always ask your healthcare professional. [address] any warranty or liability for your use of this information.

## 2018-12-13 NOTE — PROGRESS NOTES
Iris Monroe is a 66 y.o. female and presents with Hospital Follow Up (Pt is here for a hospital f/u from 09 Dec 2018. ) and Back Pain (Pt c/o lower back pain that radiates to left buttocks starting 08 Dec 2018. Pt denies any trauma. )  . Subjective:      Pt comes-in for ED f/u. Was seen @ [address] [place] [address] ED 12/9/18 for leftr sided back pain w radiation down leg. No ppt factors. No prior occurrence. Pt was given prednisone and flexeril. Did not take. She is concerned that no x-rays were done. Pt relays pain is improved      HTN   BP Readings from Last 3 Encounters:   12/13/18 119/79   02/22/18 128/79   12/28/17 154/81         Review of Systems  Review of systems (12) negative, except noted above.       Past Medical History:   Diagnosis Date    Arthritis     Asthma     H/O seasonal allergies 5/7/2013    Headache     Hypertension     Lupus     Other ill-defined conditions(799.89)     Lupus    Thyroid disease      Past Surgical History:   Procedure Laterality Date    HX GYN      Hysterectomy-partial    HX GYN      C Section X1    HX ORTHOPAEDIC      left shoulder surgery, right hand surgery 12/9/2016     Social History     Socioeconomic History    Marital status:      Spouse name: ladonna isaac give info    Number of children: 2    Years of education: Not on file    Highest education level: Not on file   Occupational History     Employer: [employer]     Comment: manager IT    Tobacco Use    Smoking status: Never Smoker    Smokeless tobacco: Never Used   Substance and Sexual Activity    Alcohol use: No    Drug use: No     Comment: denies     Sexual activity: Yes     Partners: Male     Birth control/protection: None     Family History   Problem Relation Age of Onset    Hypertension Mother     Heart Disease Mother     Stroke Mother     Hypertension Sister     Heart Disease Sister     Stroke Sister     Heart Disease Brother     Stroke Brother     Hypertension Maternal Grandmother     Heart Disease Brother     Diabetes Sister     Diabetes Sister     Diabetes Brother     Kidney Disease Sister     Diabetes Sister     Hypertension Sister     No Known Problems Daughter     No Known Problems Daughter     Breast Cancer Sister 68     Current Outpatient Medications   Medication Sig Dispense Refill    ergocalciferol, vitamin D2, (VITAMIN D2 PO) Take  by mouth.  ibuprofen (MOTRIN) 800 mg tablet Take 1 Tab by mouth every eight (8) hours as needed for Pain (take w pain). 45 Tab 1    triamterene-hydroCHLOROthiazide (DYAZIDE) 37.5-25 mg per capsule TAKE ONE CAPSULE BY MOUTH DAILY 90 Cap 1    albuterol (PROVENTIL HFA, VENTOLIN HFA, PROAIR HFA) 90 mcg/actuation inhaler Take 1 Puff by inhalation every six (6) hours as needed for Wheezing or Shortness of Breath (cough). 1 Inhaler 3    Omeprazole delayed release (PRILOSEC D/R) 20 mg tablet Take 1 Tab by mouth daily as needed. 30 Tab 5    levothyroxine (SYNTHROID) 112 mcg tablet Take 1 Tab by mouth Daily (before breakfast). SYNTHROID brand name medically necessary 90 Tab 3    atorvastatin (LIPITOR) 20 mg tablet TAKE 1 TAB BY MOUTH DAILY. 90 Tab 1    albuterol (PROVENTIL VENTOLIN) 2.5 mg /3 mL (0.083 %) nebulizer solution 1 vial Q 4-6 hrs as needed for cough 1 Package 0    CETIRIZINE HCL (ZYRTEC PO) Take  by mouth as needed.  ipratropium (ATROVENT) 0.06 % nasal spray 2 Sprays by Both Nostrils route four (4) times daily.  15 mL 11     Allergies   Allergen Reactions    Duragesic [Fentanyl] Nausea and Vomiting       Objective:  Visit Vitals  /79 (BP 1 Location: Left arm, BP Patient Position: Sitting)   Pulse 92   Temp 98.1 °F (36.7 °C) (Oral)   Resp 16   Ht 5' 6\" (1.676 m)   Wt 217 lb (98.4 kg)   SpO2 100%   BMI 35.02 kg/m²     Physical Exam:   General appearance - alert, pleasant  Mental status - alert, oriented to person, place, and time  EYE-EOMI  Neck - supple,   Chest - symmetric air entry    Abdomen - obese  Ext-+ left sided straight leg   neg rt sided straight leg  Skin-Warm and dry. no hyperpigmentation, vitiligo, or suspicious lesions  Neuro -alert, oriented, normal speech, no focal findings or movement disorder noted      Results for orders placed or performed in visit on 09/06/17   AMB POC LIPID PROFILE   Result Value Ref Range    Cholesterol (POC) 166     Triglycerides (POC) 148     HDL Cholesterol (POC) 67     Non-HDL Cholesterol 99     LDL Cholesterol (POC) 69 MG/DL    TChol/HDL Ratio (POC) 2.5        Assessment/Plan:    ICD-10-CM ICD-9-CM    1. Radicular pain M54.10 729.2 ibuprofen (MOTRIN) 800 mg tablet      CANCELED: XR SPINE LUMB MIN 4 V     Orders Placed This Encounter    ergocalciferol, vitamin D2, (VITAMIN D2 PO)     Sig: Take  by mouth.  ibuprofen (MOTRIN) 800 mg tablet     Sig: Take 1 Tab by mouth every eight (8) hours as needed for Pain (take w pain). Dispense:  45 Tab     Refill:  1     1. Radicular pain  Check xr LS spine  D/w pt moist heat  - ibuprofen (MOTRIN) 800 mg tablet; Take 1 Tab by mouth every eight (8) hours as needed for Pain (take w pain). Dispense: 45 Tab; Refill: 1    Patient Instructions          Sciatica: Care Instructions  Your Care Instructions    Sciatica (say \"gag-YV-uy-kuh\") is an irritation of one of the sciatic nerves, which come from the spinal cord in the lower back. The sciatic nerves and their branches extend down through the buttock to the foot. Sciatica can develop when an injured disc in the back presses against a spinal nerve root. Its main symptom is pain, numbness, or weakness that is often worse in the leg or foot than in the back. Sciatica often will improve and go away with time. Early treatment usually includes medicines and exercises to relieve pain. Follow-up care is a key part of your treatment and safety. Be sure to make and go to all appointments, and call your doctor if you are having problems.  It's also a good idea to know your test results and keep a list of the medicines you take.  How can you care for yourself at home? · Take pain medicines exactly as directed. ? If the doctor gave you a prescription medicine for pain, take it as prescribed. ? If you are not taking a prescription pain medicine, ask your doctor if you can take an over-the-counter medicine. · Use heat or ice to relieve pain. ? To apply heat, put a warm water bottle, heating pad set on low, or warm cloth on your back. Do not go to sleep with a heating pad on your skin. ? To use ice, put ice or a cold pack on the area for 10 to 20 minutes at a time. Put a thin cloth between the ice and your skin. · Avoid sitting if possible, unless it feels better than standing. · Alternate lying down with short walks. Increase your walking distance as you are able to without making your symptoms worse. · Do not do anything that makes your symptoms worse. When should you call for help? Call 911 anytime you think you may need emergency care. For example, call if:    · You are unable to move a leg at all.   [facility] your doctor now or seek immediate medical care if:    · You have new or worse symptoms in your legs or buttocks. Symptoms may include:  ? Numbness or tingling. ? Weakness. ? Pain.     · You lose bladder or bowel control.    Watch closely for changes in your health, and be sure to contact your doctor if:    · You are not getting better as expected. Where can you learn more? Go to http://eris-lalito.info/. Enter [phone] in the search box to learn more about \"Sciatica: Care Instructions. \"  Current as of: November 29, 2017  Content Version: 11.8  © 6297-2108 [employer]. Care instructions adapted under license by [employer] (which disclaims liability or warranty for this information).  If you have questions about a medical condition or this instruction, always ask your healthcare professional. [address] any warranty or liability for your use of this information. Follow-up Disposition:  Return if symptoms worsen or fail to improve. I have reviewed with the patient details of the assessment and plan and all questions were answered. Relevent patient education was performed. The most recent lab findings were reviewed with the patient. An After Visit Summary was printed and given to the patient.

## 2019-03-27 ENCOUNTER — OFFICE VISIT (OUTPATIENT)
Dept: INTERNAL MEDICINE CLINIC | Age: 59
End: 2019-03-27

## 2019-03-27 VITALS
RESPIRATION RATE: 19 BRPM | DIASTOLIC BLOOD PRESSURE: 68 MMHG | BODY MASS INDEX: 35.02 KG/M2 | HEIGHT: 66 IN | SYSTOLIC BLOOD PRESSURE: 126 MMHG | HEART RATE: 88 BPM | OXYGEN SATURATION: 94 % | TEMPERATURE: 98.7 F

## 2019-03-27 DIAGNOSIS — J30.9 ALLERGIC RHINITIS, UNSPECIFIED SEASONALITY, UNSPECIFIED TRIGGER: Primary | ICD-10-CM

## 2019-03-27 DIAGNOSIS — I10 ESSENTIAL HYPERTENSION: ICD-10-CM

## 2019-03-27 PROBLEM — E66.01 SEVERE OBESITY (HCC): Status: ACTIVE | Noted: 2019-03-27

## 2019-03-27 RX ORDER — MONTELUKAST SODIUM 10 MG/1
10 TABLET ORAL DAILY
Qty: 30 TAB | Refills: 5 | Status: SHIPPED | OUTPATIENT
Start: 2019-03-27

## 2019-03-27 RX ORDER — IPRATROPIUM BROMIDE 42 UG/1
2 SPRAY, METERED NASAL 3 TIMES DAILY
Qty: 15 ML | Refills: 5 | Status: SHIPPED | OUTPATIENT
Start: 2019-03-27

## 2019-03-27 RX ORDER — MINERAL OIL
180 ENEMA (ML) RECTAL
Qty: 30 TAB | Refills: 5 | Status: SHIPPED | OUTPATIENT
Start: 2019-03-27 | End: 2020-09-08

## 2019-03-27 RX ORDER — FLUTICASONE PROPIONATE 50 MCG
2 SPRAY, SUSPENSION (ML) NASAL
Qty: 1 BOTTLE | Refills: 5 | Status: SHIPPED | OUTPATIENT
Start: 2019-03-27 | End: 2020-09-08

## 2019-03-27 NOTE — PROGRESS NOTES
Chief Complaint   Patient presents with    Wheezing     1. Have you been to the ER, urgent care clinic since your last visit? Hospitalized since your last visit? No    2. Have you seen or consulted any other health care providers outside of the [address] [place] [address] since your last visit? Include any pap smears or colon screening.  No

## 2019-03-28 PROBLEM — J30.9 ALLERGIC RHINITIS: Status: ACTIVE | Noted: 2019-03-28

## 2019-03-28 NOTE — PROGRESS NOTES
Wolm Litten is a 66 y.o. female and presents with Wheezing  . Subjective:    Pt comes-in w c/o allergies. Pt has an allergist and was receiving shots, but has not f/u. Pt relays she is taking zyrtec, but it is very sedating. Pt has intranasal steroid and atrovent nasal spar, but her rxs are     PMH:  HTN   BP Readings from Last 3 Encounters:   19 126/68   18 119/79   18 128/79     Hypothyroidism, SLE, PATRICA, asthma      Review of Systems  Constitutional: negative for fevers, chills, anorexia and weight loss  Eyes:   negative for visual disturbance and irritation  ENT:   negative for tinnitus,sore throat,nasal congestion,ear pains. hoarseness  Respiratory:  negative for cough, hemoptysis, dyspnea,wheezing  CV:   negative for chest pain, palpitations, lower extremity edema  GI:   negative for nausea, vomiting, diarrhea, abdominal pain +BRBPR and heartburn @ night  Musculoskel: negative for myalgias, arthralgias, back pain, muscle weakness, joint pain  Neurological:  negative for headaches, dizziness, vertigo, memory problems and gait   Behavl/Psych: negative for feelings of anxiety, depression, mood changes    Past Medical History:   Diagnosis Date    Arthritis     Asthma     H/O seasonal allergies 2013    Headache     Hypertension     Lupus     Other ill-defined conditions(799.89)     Lupus    Thyroid disease      Past Surgical History:   Procedure Laterality Date    HX GYN      Hysterectomy-partial    HX GYN      C Section X1    HX ORTHOPAEDIC      left shoulder surgery, right hand surgery 2016     Social History     Socioeconomic History    Marital status:      Spouse name: ladonna isaac give info    Number of children: 2    Years of education: Not on file    Highest education level: Not on file   Occupational History     Employer: [employer]     Comment: manager IT    Tobacco Use    Smoking status: Never Smoker    Smokeless tobacco: Never Used   Substance and Sexual Activity    Alcohol use: No    Drug use: No     Comment: denies     Sexual activity: Yes     Partners: Male     Birth control/protection: None     Family History   Problem Relation Age of Onset    Hypertension Mother     Heart Disease Mother    [facility] Stroke Mother     Hypertension Sister     Heart Disease Sister     Stroke Sister     Heart Disease Brother     Stroke Brother     Hypertension Maternal Grandmother     Heart Disease Brother     Diabetes Sister     Diabetes Sister     Diabetes Brother     Kidney Disease Sister     Diabetes Sister     Hypertension Sister     No Known Problems Daughter     No Known Problems Daughter     Breast Cancer Sister 68     Current Outpatient Medications   Medication Sig Dispense Refill    montelukast (SINGULAIR) 10 mg tablet Take 1 Tab by mouth daily. 30 Tab 5    fluticasone propionate (FLONASE) 50 mcg/actuation nasal spray 2 Sprays by Both Nostrils route daily as needed for Rhinitis. 1 Bottle 5    fexofenadine (ALLEGRA) 180 mg tablet Take 1 Tab by mouth daily as needed for Allergies. 30 Tab 5    ipratropium (ATROVENT) 42 mcg (0.06 %) nasal spray 2 Sprays by Both Nostrils route three (3) times daily. 15 mL 5    ergocalciferol, vitamin D2, (VITAMIN D2 PO) Take  by mouth.  ibuprofen (MOTRIN) 800 mg tablet Take 1 Tab by mouth every eight (8) hours as needed for Pain (take w pain). 45 Tab 1    Omeprazole delayed release (PRILOSEC D/R) 20 mg tablet Take 1 Tab by mouth daily as needed. 30 Tab 5    triamterene-hydroCHLOROthiazide (DYAZIDE) 37.5-25 mg per capsule TAKE ONE CAPSULE BY MOUTH DAILY 90 Cap 1    albuterol (PROVENTIL VENTOLIN) 2.5 mg /3 mL (0.083 %) nebulizer solution 1 vial Q 4-6 hrs as needed for cough 1 Package 0    albuterol (PROVENTIL HFA, VENTOLIN HFA, PROAIR HFA) 90 mcg/actuation inhaler Take 1 Puff by inhalation every six (6) hours as needed for Wheezing or Shortness of Breath (cough).  1 Inhaler 3    levothyroxine (SYNTHROID) 112 mcg tablet Take 1 Tab by mouth Daily (before breakfast). SYNTHROID brand name medically necessary 90 Tab 3    atorvastatin (LIPITOR) 20 mg tablet TAKE 1 TAB BY MOUTH DAILY. 90 Tab 1     Allergies   Allergen Reactions    Duragesic [Fentanyl] Nausea and Vomiting       Objective:  Visit Vitals  /68 (BP 1 Location: Left arm, BP Patient Position: Sitting)   Pulse 88   Temp 98.7 °F (37.1 °C) (Oral)   Resp 19   Ht 5' 6\" (1.676 m)   SpO2 94%   BMI 35.02 kg/m²     Physical Exam:   General appearance - alert, pleasant  Mental status - alert, oriented to person, place, and time  EYE-EOMI  Neck - supple,   Chest - symmetric air entry    Abdomen - obese  Ext-no pedal edema, no clubbing or cyanosis  Skin-Warm and dry. no hyperpigmentation, vitiligo, or suspicious lesions  Neuro -alert, oriented, normal speech, no focal findings or movement disorder noted      Results for orders placed or performed in visit on 09/06/17   AMB POC LIPID PROFILE   Result Value Ref Range    Cholesterol (POC) 166     Triglycerides (POC) 148     HDL Cholesterol (POC) 67     Non-HDL Cholesterol 99     LDL Cholesterol (POC) 69 MG/DL    TChol/HDL Ratio (POC) 2.5        Assessment/Plan:    ICD-10-CM ICD-9-CM    1. Allergic rhinitis, unspecified seasonality, unspecified trigger J30.9 477.9 montelukast (SINGULAIR) 10 mg tablet      fluticasone propionate (FLONASE) 50 mcg/actuation nasal spray      fexofenadine (ALLEGRA) 180 mg tablet      ipratropium (ATROVENT) 42 mcg (0.06 %) nasal spray   2. Essential hypertension I10 401.9 TSH REFLEX TO T4      LIPID PANEL      METABOLIC PANEL, COMPREHENSIVE      CBC W/O DIFF      HEPATITIS C AB      VZV AB, IGG     Orders Placed This Encounter    TSH REFLEX TO T4    LIPID PANEL    METABOLIC PANEL, COMPREHENSIVE    CBC W/O DIFF    HEPATITIS C AB    VZV AB, IGG    montelukast (SINGULAIR) 10 mg tablet     Sig: Take 1 Tab by mouth daily.      Dispense:  30 Tab     Refill:  5    fluticasone propionate (FLONASE) 50 mcg/actuation nasal spray     Si Sprays by Both Nostrils route daily as needed for Rhinitis. Dispense:  1 Bottle     Refill:  5    fexofenadine (ALLEGRA) 180 mg tablet     Sig: Take 1 Tab by mouth daily as needed for Allergies. Dispense:  30 Tab     Refill:  5    ipratropium (ATROVENT) 42 mcg (0.06 %) nasal spray     Si Sprays by Both Nostrils route three (3) times daily. Dispense:  15 mL     Refill:  5     1. Allergic rhinitis, unspecified seasonality, unspecified trigger    - montelukast (SINGULAIR) 10 mg tablet; Take 1 Tab by mouth daily. Dispense: 30 Tab; Refill: 5  - fluticasone propionate (FLONASE) 50 mcg/actuation nasal spray; 2 Sprays by Both Nostrils route daily as needed for Rhinitis. Dispense: 1 Bottle; Refill: 5  - fexofenadine (ALLEGRA) 180 mg tablet; Take 1 Tab by mouth daily as needed for Allergies. Dispense: 30 Tab; Refill: 5  - ipratropium (ATROVENT) 42 mcg (0.06 %) nasal spray; 2 Sprays by Both Nostrils route three (3) times daily. Dispense: 15 mL; Refill: 5    2. Essential hypertension  Controlled on current regimen  - TSH REFLEX TO T4  - LIPID PANEL  - METABOLIC PANEL, COMPREHENSIVE  - CBC W/O DIFF  - HEPATITIS C AB  - VZV AB, IGG    There are no Patient Instructions on file for this visit. Follow-up and Dispositions    · Return in about 4 months (around 2019) for bp f/u. I have reviewed with the patient details of the assessment and plan and all questions were answered. Relevent patient education was performed. The most recent lab findings were reviewed with the patient. An After Visit Summary was printed and given to the patient.

## 2019-04-25 DIAGNOSIS — J45.40 MODERATE PERSISTENT ASTHMA, UNSPECIFIED WHETHER COMPLICATED: Primary | ICD-10-CM

## 2020-11-24 DIAGNOSIS — J30.9 ALLERGIC RHINITIS, UNSPECIFIED SEASONALITY, UNSPECIFIED TRIGGER: ICD-10-CM

## 2020-11-24 RX ORDER — FLUTICASONE PROPIONATE 50 MCG
SPRAY, SUSPENSION (ML) NASAL
Qty: 1 BOTTLE | Refills: 3 | Status: SHIPPED | OUTPATIENT
Start: 2020-11-24 | End: 2021-05-26

## 2021-04-26 DIAGNOSIS — Z76.0 MEDICATION REFILL: ICD-10-CM

## 2021-04-27 RX ORDER — ALBUTEROL SULFATE 90 UG/1
AEROSOL, METERED RESPIRATORY (INHALATION)
Qty: 8.5 INHALER | Refills: 3 | Status: SHIPPED | OUTPATIENT
Start: 2021-04-27

## 2021-06-17 ENCOUNTER — APPOINTMENT (RX ONLY)
Dept: URBAN - METROPOLITAN AREA CLINIC 1 | Facility: CLINIC | Age: 61
Setting detail: DERMATOLOGY
End: 2021-06-17

## 2021-06-17 DIAGNOSIS — L72.0 EPIDERMAL CYST: ICD-10-CM

## 2021-06-17 DIAGNOSIS — L81.0 POSTINFLAMMATORY HYPERPIGMENTATION: ICD-10-CM

## 2021-06-17 DIAGNOSIS — H02.6 XANTHELASMA OF EYELID: ICD-10-CM

## 2021-06-17 DIAGNOSIS — L20.89 OTHER ATOPIC DERMATITIS: ICD-10-CM | Status: INADEQUATELY CONTROLLED

## 2021-06-17 DIAGNOSIS — L21.8 OTHER SEBORRHEIC DERMATITIS: ICD-10-CM | Status: INADEQUATELY CONTROLLED

## 2021-06-17 PROBLEM — L20.84 INTRINSIC (ALLERGIC) ECZEMA: Status: ACTIVE | Noted: 2021-06-17

## 2021-06-17 PROBLEM — H02.60 XANTHELASMA OF UNSPECIFIED EYE, UNSPECIFIED EYELID: Status: ACTIVE | Noted: 2021-06-17

## 2021-06-17 PROCEDURE — ? PRESCRIPTION MEDICATION MANAGEMENT

## 2021-06-17 PROCEDURE — ? PRESCRIPTION

## 2021-06-17 PROCEDURE — ? COUNSELING

## 2021-06-17 PROCEDURE — 99204 OFFICE O/P NEW MOD 45 MIN: CPT

## 2021-06-17 RX ORDER — LIDOCAINE AND PRILOCAINE 25; 25 MG/G; MG/G
CREAM TOPICAL
Qty: 1 | Refills: 0 | Status: ERX

## 2021-06-17 RX ORDER — HYDROQUINONE
POWDER (GRAM) MISCELLANEOUS QHS
Qty: 1 | Refills: 1 | Status: ERX | COMMUNITY
Start: 2021-06-17

## 2021-06-17 RX ORDER — CLINDAMYCIN PHOSPHATE 10 MG/ML
SOLUTION TOPICAL
Qty: 1 | Refills: 3 | Status: ERX | COMMUNITY
Start: 2021-06-17

## 2021-06-17 RX ORDER — LIDOCAINE AND PRILOCAINE 25; 25 MG/G; MG/G
CREAM TOPICAL
Qty: 1 | Refills: 0 | Status: ERX | COMMUNITY
Start: 2021-06-17

## 2021-06-17 RX ORDER — HALOBETASOL PROPIONATE 0.5 MG/G
OINTMENT TOPICAL
Qty: 1 | Refills: 1 | Status: ERX | COMMUNITY
Start: 2021-06-17

## 2021-06-17 RX ADMIN — Medication: at 00:00

## 2021-06-17 RX ADMIN — HALOBETASOL PROPIONATE: 0.5 OINTMENT TOPICAL at 00:00

## 2021-06-17 RX ADMIN — LIDOCAINE AND PRILOCAINE: 25; 25 CREAM TOPICAL at 00:00

## 2021-06-17 RX ADMIN — CLINDAMYCIN PHOSPHATE: 10 SOLUTION TOPICAL at 00:00

## 2021-06-17 ASSESSMENT — LOCATION SIMPLE DESCRIPTION DERM
LOCATION SIMPLE: LEFT CHEEK
LOCATION SIMPLE: POSTERIOR SCALP
LOCATION SIMPLE: RIGHT FOOT
LOCATION SIMPLE: RIGHT FOREHEAD
LOCATION SIMPLE: RIGHT CHEEK
LOCATION SIMPLE: LEFT FOOT
LOCATION SIMPLE: CHIN

## 2021-06-17 ASSESSMENT — LOCATION DETAILED DESCRIPTION DERM
LOCATION DETAILED: LEFT DORSAL FOOT
LOCATION DETAILED: RIGHT MEDIAL FOREHEAD
LOCATION DETAILED: RIGHT CENTRAL MALAR CHEEK
LOCATION DETAILED: RIGHT SUPERIOR MEDIAL FOREHEAD
LOCATION DETAILED: RIGHT DORSAL FOOT
LOCATION DETAILED: POSTERIOR MID-PARIETAL SCALP
LOCATION DETAILED: LEFT CHIN
LOCATION DETAILED: LEFT CENTRAL MALAR CHEEK

## 2021-06-17 ASSESSMENT — LOCATION ZONE DERM
LOCATION ZONE: FEET
LOCATION ZONE: FACE
LOCATION ZONE: SCALP

## 2021-06-17 NOTE — PROCEDURE: PRESCRIPTION MEDICATION MANAGEMENT
Render In Strict Bullet Format?: No
Detail Level: Zone
Initiate Treatment: Pt will use halobetasol ointment to feet qam

## 2022-03-18 PROBLEM — E66.01 SEVERE OBESITY (HCC): Status: ACTIVE | Noted: 2019-03-27

## 2022-03-19 PROBLEM — R60.9 EDEMA: Status: ACTIVE | Noted: 2017-07-13

## 2022-03-19 PROBLEM — K21.9 GASTROESOPHAGEAL REFLUX DISEASE: Status: ACTIVE | Noted: 2017-05-05

## 2022-03-19 PROBLEM — J30.9 ALLERGIC RHINITIS: Status: ACTIVE | Noted: 2019-03-28

## 2022-03-22 ENCOUNTER — TRANSCRIBE ORDER (OUTPATIENT)
Dept: SCHEDULING | Age: 62
End: 2022-03-22

## 2022-03-22 DIAGNOSIS — E66.9 OBESITY (BMI 30-39.9): ICD-10-CM

## 2022-03-22 DIAGNOSIS — M32.9 LUPUS (HCC): ICD-10-CM

## 2022-03-22 DIAGNOSIS — G47.30 SLEEP APNEA, UNSPECIFIED TYPE: ICD-10-CM

## 2022-03-22 DIAGNOSIS — K21.9 GASTROESOPHAGEAL REFLUX DISEASE, UNSPECIFIED WHETHER ESOPHAGITIS PRESENT: ICD-10-CM

## 2022-03-22 DIAGNOSIS — R11.10 REGURGITATION OF STOMACH CONTENTS: Primary | ICD-10-CM

## 2022-03-22 DIAGNOSIS — Z12.11 COLON CANCER SCREENING: ICD-10-CM

## 2022-04-06 ENCOUNTER — HOSPITAL ENCOUNTER (OUTPATIENT)
Dept: GENERAL RADIOLOGY | Age: 62
Discharge: HOME | End: 2022-04-06
Attending: INTERNAL MEDICINE
Payer: COMMERCIAL

## 2022-04-06 DIAGNOSIS — E66.9 OBESITY (BMI 30-39.9): ICD-10-CM

## 2022-04-06 DIAGNOSIS — M32.9 LUPUS (HCC): ICD-10-CM

## 2022-04-06 DIAGNOSIS — G47.30 SLEEP APNEA, UNSPECIFIED TYPE: ICD-10-CM

## 2022-04-06 DIAGNOSIS — R11.10 REGURGITATION OF STOMACH CONTENTS: ICD-10-CM

## 2022-04-06 DIAGNOSIS — K21.9 GASTROESOPHAGEAL REFLUX DISEASE, UNSPECIFIED WHETHER ESOPHAGITIS PRESENT: ICD-10-CM

## 2022-04-06 DIAGNOSIS — Z12.11 COLON CANCER SCREENING: ICD-10-CM

## 2022-04-06 PROCEDURE — 74220 X-RAY XM ESOPHAGUS 1CNTRST: CPT

## 2023-02-16 ENCOUNTER — APPOINTMENT (RX ONLY)
Dept: URBAN - METROPOLITAN AREA CLINIC 1 | Facility: CLINIC | Age: 63
Setting detail: DERMATOLOGY
End: 2023-02-16

## 2023-02-16 DIAGNOSIS — L63.8 OTHER ALOPECIA AREATA: ICD-10-CM | Status: INADEQUATELY CONTROLLED

## 2023-02-16 DIAGNOSIS — L30.4 ERYTHEMA INTERTRIGO: ICD-10-CM | Status: INADEQUATELY CONTROLLED

## 2023-02-16 DIAGNOSIS — L21.8 OTHER SEBORRHEIC DERMATITIS: ICD-10-CM | Status: INADEQUATELY CONTROLLED

## 2023-02-16 PROCEDURE — ? COUNSELING

## 2023-02-16 PROCEDURE — 11901 INJECT SKIN LESIONS >7: CPT

## 2023-02-16 PROCEDURE — ? PRESCRIPTION

## 2023-02-16 PROCEDURE — ? PRESCRIPTION MEDICATION MANAGEMENT

## 2023-02-16 PROCEDURE — ? INTRALESIONAL KENALOG

## 2023-02-16 PROCEDURE — 99214 OFFICE O/P EST MOD 30 MIN: CPT | Mod: 25

## 2023-02-16 RX ORDER — KETOCONAZOLE 20 MG/G
CREAM TOPICAL
Qty: 60 | Refills: 2 | Status: ERX | COMMUNITY
Start: 2023-02-16

## 2023-02-16 RX ORDER — HALOBETASOL PROPIONATE 0.5 MG/G
OINTMENT TOPICAL QPM
Qty: 50 | Refills: 2 | Status: ERX | COMMUNITY
Start: 2023-02-16

## 2023-02-16 RX ADMIN — KETOCONAZOLE: 20 CREAM TOPICAL at 00:00

## 2023-02-16 RX ADMIN — HALOBETASOL PROPIONATE: 0.5 OINTMENT TOPICAL at 00:00

## 2023-02-16 ASSESSMENT — LOCATION DETAILED DESCRIPTION DERM
LOCATION DETAILED: LEFT CENTRAL PARIETAL SCALP
LOCATION DETAILED: POSTERIOR MID-PARIETAL SCALP
LOCATION DETAILED: LEFT CENTRAL FRONTAL SCALP
LOCATION DETAILED: RIGHT SUPERIOR FOREHEAD
LOCATION DETAILED: LOWER STERNUM
LOCATION DETAILED: MID-FRONTAL SCALP
LOCATION DETAILED: RIGHT SUPERIOR PARIETAL SCALP
LOCATION DETAILED: RIGHT SUPERIOR MEDIAL FOREHEAD
LOCATION DETAILED: SUPERIOR MID FOREHEAD
LOCATION DETAILED: LEFT SUPERIOR PARIETAL SCALP
LOCATION DETAILED: RIGHT CENTRAL FRONTAL SCALP
LOCATION DETAILED: LEFT LATERAL FOREHEAD
LOCATION DETAILED: RIGHT CENTRAL PARIETAL SCALP
LOCATION DETAILED: LEFT SUPERIOR FOREHEAD
LOCATION DETAILED: LEFT MEDIAL FRONTAL SCALP

## 2023-02-16 ASSESSMENT — LOCATION SIMPLE DESCRIPTION DERM
LOCATION SIMPLE: SCALP
LOCATION SIMPLE: LEFT FOREHEAD
LOCATION SIMPLE: ANTERIOR SCALP
LOCATION SIMPLE: LEFT SCALP
LOCATION SIMPLE: RIGHT FOREHEAD
LOCATION SIMPLE: CHEST
LOCATION SIMPLE: POSTERIOR SCALP
LOCATION SIMPLE: SUPERIOR FOREHEAD
LOCATION SIMPLE: RIGHT SCALP

## 2023-02-16 ASSESSMENT — LOCATION ZONE DERM
LOCATION ZONE: TRUNK
LOCATION ZONE: FACE
LOCATION ZONE: SCALP

## 2023-02-16 NOTE — PROCEDURE: COUNSELING
Patient Specific Counseling (Will Not Stick From Patient To Patient): Pt notes she did a protective style/artificial hair and it gave her blisters and made her flare. Pt notes she went to her PCP and they gave her a steroid because she ran out.
Detail Level: Zone
Detail Level: Detailed

## 2023-02-16 NOTE — PROCEDURE: PRESCRIPTION MEDICATION MANAGEMENT
Continue Regimen: Halobetasol ointment
Render In Strict Bullet Format?: No
Detail Level: Zone
Initiate Treatment: Ketoconazole cream

## 2023-02-16 NOTE — PROCEDURE: INTRALESIONAL KENALOG
Consent: The risks of atrophy were reviewed with the patient.
X Size Of Lesion In Cm (Optional): 0
Administered By (Optional): Lydia Elizalde
Detail Level: Simple
Validate Note Data When Using Inventory: Yes
Medical Necessity Clause: This procedure was medically necessary because the lesions that were treated were:
Kenalog Preparation: Kenalog
Concentration Of Solution Injected (Mg/Ml): 4.0
Include Z78.9 (Other Specified Conditions Influencing Health Status) As An Associated Diagnosis?: No
Total Volume Injected (Ccs- Only Use Numbers And Decimals): 1.0

## 2023-05-16 ENCOUNTER — APPOINTMENT (OUTPATIENT)
Facility: HOSPITAL | Age: 63
End: 2023-05-16
Payer: COMMERCIAL

## 2023-05-16 ENCOUNTER — HOSPITAL ENCOUNTER (EMERGENCY)
Facility: HOSPITAL | Age: 63
Discharge: HOME | End: 2023-05-17
Attending: STUDENT IN AN ORGANIZED HEALTH CARE EDUCATION/TRAINING PROGRAM
Payer: COMMERCIAL

## 2023-05-16 VITALS
SYSTOLIC BLOOD PRESSURE: 138 MMHG | TEMPERATURE: 98.4 F | OXYGEN SATURATION: 96 % | DIASTOLIC BLOOD PRESSURE: 85 MMHG | WEIGHT: 235.45 LBS | HEART RATE: 79 BPM | RESPIRATION RATE: 20 BRPM

## 2023-05-16 DIAGNOSIS — R60.9 PERIPHERAL EDEMA: Primary | ICD-10-CM

## 2023-05-16 LAB
ALBUMIN SERPL-MCNC: 3.2 G/DL (ref 3.5–5)
ALBUMIN/GLOB SERPL: 0.6 (ref 1.1–2.2)
ALP SERPL-CCNC: 86 U/L (ref 45–117)
ALT SERPL-CCNC: 21 U/L (ref 12–78)
ANION GAP SERPL CALC-SCNC: 0 MMOL/L (ref 5–15)
AST SERPL-CCNC: 22 U/L (ref 15–37)
BASOPHILS # BLD: 0 K/UL (ref 0–0.1)
BASOPHILS NFR BLD: 0 % (ref 0–1)
BILIRUB SERPL-MCNC: 0.3 MG/DL (ref 0.2–1)
BUN SERPL-MCNC: 17 MG/DL (ref 6–20)
BUN/CREAT SERPL: 20 (ref 12–20)
CALCIUM SERPL-MCNC: 9.1 MG/DL (ref 8.5–10.1)
CHLORIDE SERPL-SCNC: 103 MMOL/L (ref 97–108)
CO2 SERPL-SCNC: 31 MMOL/L (ref 21–32)
COMMENT:: NORMAL
CREAT SERPL-MCNC: 0.86 MG/DL (ref 0.55–1.02)
DIFFERENTIAL METHOD BLD: NORMAL
EOSINOPHIL # BLD: 0.2 K/UL (ref 0–0.4)
EOSINOPHIL NFR BLD: 3 % (ref 0–7)
ERYTHROCYTE [DISTWIDTH] IN BLOOD BY AUTOMATED COUNT: 13.4 % (ref 11.5–14.5)
GLOBULIN SER CALC-MCNC: 5.1 G/DL (ref 2–4)
GLUCOSE SERPL-MCNC: 96 MG/DL (ref 65–100)
HCT VFR BLD AUTO: 36.6 % (ref 35–47)
HGB BLD-MCNC: 11.6 G/DL (ref 11.5–16)
IMM GRANULOCYTES # BLD AUTO: 0 K/UL (ref 0–0.04)
IMM GRANULOCYTES NFR BLD AUTO: 0 % (ref 0–0.5)
LYMPHOCYTES # BLD: 2.5 K/UL (ref 0.8–3.5)
LYMPHOCYTES NFR BLD: 40 % (ref 12–49)
MCH RBC QN AUTO: 29.1 PG (ref 26–34)
MCHC RBC AUTO-ENTMCNC: 31.7 G/DL (ref 30–36.5)
MCV RBC AUTO: 91.7 FL (ref 80–99)
MONOCYTES # BLD: 0.4 K/UL (ref 0–1)
MONOCYTES NFR BLD: 7 % (ref 5–13)
NEUTS SEG # BLD: 3.1 K/UL (ref 1.8–8)
NEUTS SEG NFR BLD: 50 % (ref 32–75)
NRBC # BLD: 0 K/UL (ref 0–0.01)
NRBC BLD-RTO: 0 PER 100 WBC
NT PRO BNP: 27 PG/ML
PLATELET # BLD AUTO: 266 K/UL (ref 150–400)
PMV BLD AUTO: 10 FL (ref 8.9–12.9)
POTASSIUM SERPL-SCNC: 3.7 MMOL/L (ref 3.5–5.1)
PROT SERPL-MCNC: 8.3 G/DL (ref 6.4–8.2)
RBC # BLD AUTO: 3.99 M/UL (ref 3.8–5.2)
SODIUM SERPL-SCNC: 134 MMOL/L (ref 136–145)
SPECIMEN HOLD: NORMAL
WBC # BLD AUTO: 6.3 K/UL (ref 3.6–11)

## 2023-05-16 PROCEDURE — 36415 COLL VENOUS BLD VENIPUNCTURE: CPT

## 2023-05-16 PROCEDURE — 71046 X-RAY EXAM CHEST 2 VIEWS: CPT

## 2023-05-16 PROCEDURE — 93970 EXTREMITY STUDY: CPT

## 2023-05-16 PROCEDURE — 80053 COMPREHEN METABOLIC PANEL: CPT

## 2023-05-16 PROCEDURE — 83880 ASSAY OF NATRIURETIC PEPTIDE: CPT

## 2023-05-16 PROCEDURE — 85025 COMPLETE CBC W/AUTO DIFF WBC: CPT

## 2023-05-16 PROCEDURE — 99285 EMERGENCY DEPT VISIT HI MDM: CPT

## 2023-05-16 PROCEDURE — 93005 ELECTROCARDIOGRAM TRACING: CPT | Performed by: STUDENT IN AN ORGANIZED HEALTH CARE EDUCATION/TRAINING PROGRAM

## 2023-05-16 ASSESSMENT — ENCOUNTER SYMPTOMS
EYE REDNESS: 0
VOMITING: 0
EYE PAIN: 0
NAUSEA: 0
DIARRHEA: 0
SHORTNESS OF BREATH: 1
ABDOMINAL PAIN: 0
COUGH: 0

## 2023-05-16 ASSESSMENT — PAIN - FUNCTIONAL ASSESSMENT: PAIN_FUNCTIONAL_ASSESSMENT: NONE - DENIES PAIN

## 2023-05-16 NOTE — ED TRIAGE NOTES
Pt c/o increased bilateral leg swelling with intermittent SOB. Pt reports she is unable to lay flat at night because she feels like she is choking. Denies CP.

## 2023-05-17 LAB
EKG ATRIAL RATE: 85 BPM
EKG DIAGNOSIS: NORMAL
EKG P AXIS: 44 DEGREES
EKG P-R INTERVAL: 146 MS
EKG Q-T INTERVAL: 362 MS
EKG QRS DURATION: 76 MS
EKG QTC CALCULATION (BAZETT): 430 MS
EKG R AXIS: 53 DEGREES
EKG T AXIS: 12 DEGREES
EKG VENTRICULAR RATE: 85 BPM

## 2023-05-17 NOTE — ED PROVIDER NOTES
sounds. Abdominal:      General: There is no distension. Palpations: Abdomen is soft. Tenderness: There is no abdominal tenderness. Musculoskeletal:         General: Normal range of motion. Cervical back: Normal range of motion and neck supple. Comments: 1+ pitting edema bilateral lower extremities   Skin:     General: Skin is warm and dry. Capillary Refill: Capillary refill takes less than 2 seconds. Neurological:      General: No focal deficit present. Mental Status: She is alert and oriented to person, place, and time. Psychiatric:         Mood and Affect: Mood normal.         Behavior: Behavior normal.       DIAGNOSTIC RESULTS     EKG: All EKG's are interpreted by the Emergency Department Physician who either signs or Co-signs this chart in the absence of a cardiologist.        RADIOLOGY:   Interpretation per the Radiologist below, if available at the time of this note:    XR CHEST (2 VW)   Final Result   Clear lungs. Vascular duplex lower extremity venous bilateral    (Results Pending)        LABS:  Labs Reviewed   COMPREHENSIVE METABOLIC PANEL - Abnormal; Notable for the following components:       Result Value    Sodium 134 (*)     Anion Gap 0 (*)     Total Protein 8.3 (*)     Albumin 3.2 (*)     Globulin 5.1 (*)     Albumin/Globulin Ratio 0.6 (*)     All other components within normal limits   CBC WITH AUTO DIFFERENTIAL   EXTRA TUBES HOLD   BRAIN NATRIURETIC PEPTIDE       All other labs were within normal range or not returned as of this dictation. [facility]     ED Course as of 05/16/23 2216   Tue May 16, 2023   1953 EKG time 1929  Normal sinus rhythm rate of 85 with narrow QRS, normal QTc, nonspecific ST-T wave changes without ST elevations or depressions, normal axis noted.  [CC]      ED Course User Index  [CC] Dwayne Ochoa DO           CONSULTS:  None    PROCEDURES:  Unless otherwise noted below, none     Procedures      DIFFERENTIAL

## 2023-08-24 ENCOUNTER — TRANSCRIBE ORDERS (OUTPATIENT)
Facility: HOSPITAL | Age: 63
End: 2023-08-24

## 2023-08-24 DIAGNOSIS — R19.8 OTHER SPECIFIED SYMPTOMS AND SIGNS INVOLVING THE DIGESTIVE SYSTEM AND ABDOMEN: ICD-10-CM

## 2023-08-24 DIAGNOSIS — R80.9 PROTEINURIA, UNSPECIFIED TYPE: Primary | ICD-10-CM

## 2023-09-05 ENCOUNTER — TRANSCRIBE ORDERS (OUTPATIENT)
Facility: HOSPITAL | Age: 63
End: 2023-09-05

## 2023-09-05 DIAGNOSIS — R80.9 PROTEINURIA, UNSPECIFIED TYPE: Primary | ICD-10-CM

## 2023-09-05 DIAGNOSIS — R19.8 GASTROINTESTINAL SYMPTOMS: ICD-10-CM

## 2023-09-05 DIAGNOSIS — R19.8 RASPBERRY TONGUE: ICD-10-CM

## 2023-09-06 ENCOUNTER — HOSPITAL ENCOUNTER (OUTPATIENT)
Facility: HOSPITAL | Age: 63
Discharge: HOME | End: 2023-09-09
Attending: INTERNAL MEDICINE
Payer: COMMERCIAL

## 2023-09-06 DIAGNOSIS — R19.8 GASTROINTESTINAL SYMPTOMS: ICD-10-CM

## 2023-09-06 DIAGNOSIS — R80.9 PROTEINURIA, UNSPECIFIED TYPE: ICD-10-CM

## 2023-09-06 PROCEDURE — 76700 US EXAM ABDOM COMPLETE: CPT

## 2024-11-04 ENCOUNTER — OFFICE VISIT (OUTPATIENT)
Age: 64
End: 2024-11-04
Payer: COMMERCIAL

## 2024-11-04 ENCOUNTER — TELEPHONE (OUTPATIENT)
Age: 64
End: 2024-11-04

## 2024-11-04 VITALS
OXYGEN SATURATION: 99 % | HEIGHT: 66 IN | BODY MASS INDEX: 37.54 KG/M2 | WEIGHT: 233.6 LBS | HEART RATE: 82 BPM | SYSTOLIC BLOOD PRESSURE: 154 MMHG | DIASTOLIC BLOOD PRESSURE: 86 MMHG

## 2024-11-04 DIAGNOSIS — Z82.49 FAMILY HISTORY OF EARLY CAD: ICD-10-CM

## 2024-11-04 DIAGNOSIS — R55 PRE-SYNCOPE: ICD-10-CM

## 2024-11-04 DIAGNOSIS — E78.2 MIXED HYPERLIPIDEMIA: ICD-10-CM

## 2024-11-04 DIAGNOSIS — I10 BENIGN ESSENTIAL HTN: ICD-10-CM

## 2024-11-04 DIAGNOSIS — R00.2 HEART PALPITATIONS: ICD-10-CM

## 2024-11-04 DIAGNOSIS — R07.9 CHEST PAIN, UNSPECIFIED TYPE: Primary | ICD-10-CM

## 2024-11-04 PROCEDURE — 93000 ELECTROCARDIOGRAM COMPLETE: CPT | Performed by: INTERNAL MEDICINE

## 2024-11-04 PROCEDURE — 3077F SYST BP >= 140 MM HG: CPT | Performed by: INTERNAL MEDICINE

## 2024-11-04 PROCEDURE — 3079F DIAST BP 80-89 MM HG: CPT | Performed by: INTERNAL MEDICINE

## 2024-11-04 PROCEDURE — 99204 OFFICE O/P NEW MOD 45 MIN: CPT | Performed by: INTERNAL MEDICINE

## 2024-11-04 RX ORDER — ERGOCALCIFEROL 1.25 MG/1
1 CAPSULE, LIQUID FILLED ORAL DAILY
COMMUNITY
Start: 2024-01-24

## 2024-11-04 RX ORDER — FERROUS SULFATE 325(65) MG
325 TABLET ORAL DAILY
COMMUNITY

## 2024-11-04 ASSESSMENT — PATIENT HEALTH QUESTIONNAIRE - PHQ9
SUM OF ALL RESPONSES TO PHQ QUESTIONS 1-9: 0
SUM OF ALL RESPONSES TO PHQ QUESTIONS 1-9: 0
2. FEELING DOWN, DEPRESSED OR HOPELESS: NOT AT ALL
1. LITTLE INTEREST OR PLEASURE IN DOING THINGS: NOT AT ALL
SUM OF ALL RESPONSES TO PHQ QUESTIONS 1-9: 0
SUM OF ALL RESPONSES TO PHQ9 QUESTIONS 1 & 2: 0
SUM OF ALL RESPONSES TO PHQ QUESTIONS 1-9: 0

## 2024-11-04 NOTE — TELEPHONE ENCOUNTER
Enrolled with [employer] - Ordered and being shipped to patient's home address on file.  ETA within 5-7 business days.         Loop  Received: Today  Ebony Garcia, RN  Dasha Mendez  Please order a 30 day loop monitor for presyncope per Dr. Gibson.    Thanks

## 2024-11-04 NOTE — PROGRESS NOTES
[place]: Gibson  [phone]    HPI:  Ms. Mohan is a 65 yo F seen by Dr. Guillen at [facility] in the past, essential hypertension, mixed hyperlipidemia, family history of early coronary artery disease, history of recent knee surgery, referred by Dr. Lewis for cardiac evaluation.  She is interested in establishing another cardiologist.  She says she has had various cardiac workups though it has been a few years.  From a symptom standpoint, she has been having occasion chest discomfort.  It feels like an \"aching\" sensation, left-sided, can happen with exertion.  Overall, her breathing has been okay.  She has had episodes of palpitations and pre-syncope that occur 2-3 times per month, can happen with no clear triggers or exacerbating factors, lasting just less than a minute.  She was told she had some enlargement of her aorta before but says it has been a while since that has been rechecked.  She also does have a history of lupus, asthma and arthritis.  She was seen by Nephrology in the past for proteinuria.  She is compensated on exam with clear lungs and no lower extremity edema.  EKG was normal sinus rhythm, nonspecific ST-T save abnormality.    Soc hx. No tobacco  Fam hx. Early CAD in family.  Assessment/Plan:    1. Chest pain.  Pain with typical/atypical features with multiple risk factors; will proceed with a treadmill stress test for further evaluation.   2. Pre-syncope.  Will obtain a one-month loop monitor to evaluate for possible arrhythmia.   3. Ascending aortic aneurysm. Will request prior records.  Will obtain and echo and decide surveillance depending on the results.   4. Essential hypertension.    5. Mixed hyperlipidemia.  Tolerating statin.        She  has a past medical history of Arthritis, Asthma, H/O seasonal allergies, Headache, Hypertension, Lupus, Other ill-defined conditions(799.89), and Thyroid disease.    All other systems negative except as above.     PE  Vitals:    11/04/24 0818

## 2024-11-04 NOTE — PROGRESS NOTES
1. Have you been to the ER, urgent care clinic since your last visit?  Hospitalized since your last visit?No    2. Have you seen or consulted any other health care providers outside of the [facility] since your last visit?  Include any pap smears or colon screening. Yes When: October 2024

## 2024-11-15 ENCOUNTER — TELEPHONE (OUTPATIENT)
Age: 64
End: 2024-11-15

## 2024-11-15 NOTE — TELEPHONE ENCOUNTER
Attempted to reach patient by telephone. A message was left for return call.     Sent a [employer] message to patient with results.

## 2024-11-15 NOTE — TELEPHONE ENCOUNTER
----- Message from Dr. Ajay Gibson MD sent at 11/15/2024  1:12 PM EST -----  Please let pt know echo was overall normal. Normal LV function. Very mild enlargement of the aorta at 3.8 cm; can follow up in 3-4 yrs with an echo. thx

## 2024-11-15 NOTE — TELEPHONE ENCOUNTER
Patient is calling because she would like to know if she needs to continue wearing the heart monitor since everything was well.Patient said that the device is irritating her skin and there is redness present.    [phone]

## 2024-11-15 NOTE — TELEPHONE ENCOUNTER
Telephone call made to patient. Two patient identifiers verified.   Went over results with patient. Verified understanding. All questions answered.   Went over both echo and stress test.

## 2024-11-15 NOTE — TELEPHONE ENCOUNTER
Ajay Gibson MD   to Me       11/15/24  2:56 PM  Yes can stop wearing monitor and send back; will update her on results when we get the report. Thx    Telephone call made to patient. Two patient identifiers verified.   Let the patient she could send the monitor back

## 2024-11-15 NOTE — TELEPHONE ENCOUNTER
----- Message from Dr. Ajay Gibson MD sent at 11/15/2024 10:26 AM EST -----  Please let pt know stress test was normal. thx

## 2024-11-26 ENCOUNTER — TELEPHONE (OUTPATIENT)
Age: 64
End: 2024-11-26

## 2024-11-26 NOTE — TELEPHONE ENCOUNTER
----- Message from Dr. Ajay Gibson MD sent at 11/26/2024 12:21 PM EST -----  Please let pt know her monitor was normal. thx

## 2025-04-02 ENCOUNTER — APPOINTMENT (OUTPATIENT)
Dept: URBAN - METROPOLITAN AREA CLINIC 1 | Facility: CLINIC | Age: 65
Setting detail: DERMATOLOGY
End: 2025-04-02

## 2025-04-02 DIAGNOSIS — L21.8 OTHER SEBORRHEIC DERMATITIS: ICD-10-CM | Status: STABLE

## 2025-04-02 PROCEDURE — ? PRESCRIPTION

## 2025-04-02 PROCEDURE — 99213 OFFICE O/P EST LOW 20 MIN: CPT

## 2025-04-02 PROCEDURE — ? TREATMENT GOALS

## 2025-04-02 PROCEDURE — ? COUNSELING

## 2025-04-02 PROCEDURE — ? TREATMENT REGIMEN

## 2025-04-02 PROCEDURE — ? PRESCRIPTION MEDICATION MANAGEMENT

## 2025-04-02 RX ORDER — MOMETASONE FUROATE 1 MG/G
OINTMENT TOPICAL
Qty: 45 | Refills: 2 | Status: ERX | COMMUNITY
Start: 2025-04-02

## 2025-04-02 RX ORDER — HALOBETASOL PROPIONATE 0.5 MG/G
OINTMENT TOPICAL
Qty: 50 | Refills: 7 | Status: ERX

## 2025-04-02 RX ADMIN — MOMETASONE FUROATE: 1 OINTMENT TOPICAL at 00:00

## 2025-04-02 ASSESSMENT — LOCATION DETAILED DESCRIPTION DERM
LOCATION DETAILED: POSTERIOR MID-PARIETAL SCALP
LOCATION DETAILED: RIGHT CENTRAL FRONTAL SCALP
LOCATION DETAILED: RIGHT LATERAL SUPERIOR EYELID
LOCATION DETAILED: LEFT CENTRAL PARIETAL SCALP
LOCATION DETAILED: LEFT LATERAL SUPERIOR EYELID

## 2025-04-02 ASSESSMENT — LOCATION SIMPLE DESCRIPTION DERM
LOCATION SIMPLE: LEFT SUPERIOR EYELID
LOCATION SIMPLE: RIGHT SUPERIOR EYELID
LOCATION SIMPLE: SCALP
LOCATION SIMPLE: POSTERIOR SCALP

## 2025-04-02 ASSESSMENT — LOCATION ZONE DERM
LOCATION ZONE: SCALP
LOCATION ZONE: EYELID

## 2025-04-02 ASSESSMENT — SEVERITY ASSESSMENT: HOW SEVERE IS THIS PATIENT'S CONDITION?: MILD

## 2025-04-02 NOTE — PROCEDURE: PRESCRIPTION MEDICATION MANAGEMENT
Initiate Treatment: mometasone 0.1 % topical ointment \\nQuantity: 45.0 g  Days Supply: 30\\nSig: Apply to affected areas on face BID x 1 week. Repeat Prn flares
Continue Regimen: halobetasol propionate 0.05 % topical ointment \\nQuantity: 50.0 g  Days Supply: 30\\nSig: Apply to scalp 2-3 times a week
Detail Level: Zone
Render In Strict Bullet Format?: No

## 2025-04-02 NOTE — PROCEDURE: TREATMENT REGIMEN
Samples Given: La Roche Posay: hydrating cleanser and double repair moisturizer
Initiate Treatment: Cleanse and moisturize face with gentle hydrating products
Detail Level: Zone

## 2025-06-26 ENCOUNTER — OFFICE VISIT (OUTPATIENT)
Age: 65
End: 2025-06-26
Payer: COMMERCIAL

## 2025-06-26 VITALS
DIASTOLIC BLOOD PRESSURE: 84 MMHG | HEART RATE: 76 BPM | BODY MASS INDEX: 38.57 KG/M2 | TEMPERATURE: 97 F | OXYGEN SATURATION: 98 % | SYSTOLIC BLOOD PRESSURE: 132 MMHG | WEIGHT: 231.48 LBS | RESPIRATION RATE: 16 BRPM | HEIGHT: 65 IN

## 2025-06-26 DIAGNOSIS — I10 PRIMARY HYPERTENSION: ICD-10-CM

## 2025-06-26 DIAGNOSIS — E78.2 MIXED HYPERLIPIDEMIA: ICD-10-CM

## 2025-06-26 DIAGNOSIS — R73.03 PREDIABETES: ICD-10-CM

## 2025-06-26 DIAGNOSIS — J45.909 ASTHMA DUE TO SEASONAL ALLERGIES: Primary | ICD-10-CM

## 2025-06-26 DIAGNOSIS — E03.9 ACQUIRED HYPOTHYROIDISM: ICD-10-CM

## 2025-06-26 LAB
CHOLEST SERPL-MCNC: 220 MG/DL
EST. AVERAGE GLUCOSE BLD GHB EST-MCNC: 114 MG/DL
HBA1C MFR BLD: 5.6 % (ref 4–5.6)
HDLC SERPL-MCNC: 75 MG/DL
HDLC SERPL: 2.9 (ref 0–5)
LDLC SERPL CALC-MCNC: 126.2 MG/DL (ref 0–100)
TRIGL SERPL-MCNC: 94 MG/DL
TSH SERPL DL<=0.05 MIU/L-ACNC: 4.28 UIU/ML (ref 0.36–3.74)
VLDLC SERPL CALC-MCNC: 18.8 MG/DL

## 2025-06-26 PROCEDURE — 99204 OFFICE O/P NEW MOD 45 MIN: CPT | Performed by: STUDENT IN AN ORGANIZED HEALTH CARE EDUCATION/TRAINING PROGRAM

## 2025-06-26 PROCEDURE — 3075F SYST BP GE 130 - 139MM HG: CPT | Performed by: STUDENT IN AN ORGANIZED HEALTH CARE EDUCATION/TRAINING PROGRAM

## 2025-06-26 PROCEDURE — 3078F DIAST BP <80 MM HG: CPT | Performed by: STUDENT IN AN ORGANIZED HEALTH CARE EDUCATION/TRAINING PROGRAM

## 2025-06-26 RX ORDER — ALBUTEROL SULFATE 90 UG/1
2 INHALANT RESPIRATORY (INHALATION) EVERY 4 HOURS PRN
Qty: 18 G | Refills: 10 | Status: SHIPPED | OUTPATIENT
Start: 2025-06-26

## 2025-06-26 RX ORDER — MOMETASONE FUROATE 1 MG/G
OINTMENT TOPICAL DAILY
COMMUNITY
Start: 2025-04-02

## 2025-06-26 RX ORDER — HALOBETASOL PROPIONATE 0.05 %
OINTMENT (GRAM) TOPICAL 2 TIMES DAILY
COMMUNITY
Start: 2025-04-02

## 2025-06-26 SDOH — ECONOMIC STABILITY: FOOD INSECURITY: WITHIN THE PAST 12 MONTHS, YOU WORRIED THAT YOUR FOOD WOULD RUN OUT BEFORE YOU GOT MONEY TO BUY MORE.: NEVER TRUE

## 2025-06-26 SDOH — ECONOMIC STABILITY: FOOD INSECURITY: WITHIN THE PAST 12 MONTHS, THE FOOD YOU BOUGHT JUST DIDN'T LAST AND YOU DIDN'T HAVE MONEY TO GET MORE.: NEVER TRUE

## 2025-06-26 ASSESSMENT — PATIENT HEALTH QUESTIONNAIRE - PHQ9
SUM OF ALL RESPONSES TO PHQ QUESTIONS 1-9: 0
1. LITTLE INTEREST OR PLEASURE IN DOING THINGS: NOT AT ALL
2. FEELING DOWN, DEPRESSED OR HOPELESS: NOT AT ALL
SUM OF ALL RESPONSES TO PHQ QUESTIONS 1-9: 0

## 2025-06-26 NOTE — PROGRESS NOTES
[facility]  [address] [place] [address] [phone]    C/C: Acute/chronic medical problems    Subjective    Archana Mohan is a 64 y.o.  Black /   female who presents to clinic today for evaluation of the issues listed below. Pt is new to the practice.    Previous pcp: Houston Hare MD       Subjective;    Patient presenting for initial office visit with me today.    Significant PMHx include: Prediabetes, Hypothyroidism, HTN, HLD, Proteinuria (follows with nephrology), vit D deficiency, s/p bilateral knee meniscectomy, Allergies and vitamin D deficiency. Pt is here to establish care and follow up on acute/chronic conditions;    Asthma:  Seasonal.  Stable with albuterol.  Takes both allegra and Singulair for her allergies.    HTN:  Stable.  Prefers to manage without meds.    Proteinuria:  Stable. Follows with nephro. States that they will likely see her one more time and release her as workup has been stable. No meds needed    Hypothyroidism:  States she was on Levothyroxine but she stopped taking because of side effects (making her more anxious and jittery). States that she is not interested the meds.    Scheduled for mammogram already through her OB/GYN    Pt denies any  fever, chill, chest pain, SOB, abdominal pain, n/v/d, HA or dizziness.     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     and lives with     Other Physician/providers:  -Rheumatologist - Left the practice (Dr. Rollins - in [place], [place])- connective tissue disease. No treatement  -OB/GYN  -Dermatologist  -Nephrology    Allergies- reviewed:   Allergies   Allergen Reactions    Fentanyl Nausea And Vomiting       Past Medical History- reviewed:  Past Medical History:   Diagnosis Date    Arthritis     Asthma     H/O seasonal allergies

## 2025-06-26 NOTE — PROGRESS NOTES
Chief Complaint   Patient presents with    New Patient     Houston Hare MD    Establish Care    Medication Refill     \"Have you been to the ER, urgent care clinic since your last visit?  Hospitalized since your last visit?\"      Yes 2025  [facility]  Allergies/SOB    “Have you seen or consulted any other health care providers outside of [facility] since your last visit?”      Yes  2024  [facility]  Pain/ Low back pain, unspecified   Lizbeth Montanez, DO         Have you had a mammogram?”   NO  Scheduled for 2025  Date of last Mammogram: 3/6/2024      “Have you had a pap smear?”      Hysterectomy  GYN Provider        Vitals:    25 0845 25 0924   BP: (!) 152/78 132/84   Pulse: 76    Resp: 16    Temp: 97 °F (36.1 °C)    TempSrc: Temporal    SpO2: 98%    Weight: 105 kg (231 lb 7.7 oz)    Height: 1.651 m (5' 5\")       Health Maintenance Due   Topic Date Due    HIV screen  Never done    Hepatitis C screen  Never done    Pneumococcal 50+ years Vaccine (1 of 2 - PCV) Never done    Cervical cancer screen  Never done    Lipids  Never done    Shingles vaccine (1 of 2) Never done    Respiratory Syncytial Virus (RSV) Pregnant or age 60 yrs+ (1 - Risk 60-74 years 1-dose series) Never done    COVID-19 Vaccine ( -  season) Never done    Breast cancer screen  2025      The patient, Archana Mohan, identity was verified by name and , pharmacy verified  Labs:  Fasting:

## 2025-07-06 ENCOUNTER — RESULTS FOLLOW-UP (OUTPATIENT)
Age: 65
End: 2025-07-06

## 2025-08-06 ENCOUNTER — APPOINTMENT (OUTPATIENT)
Dept: URBAN - METROPOLITAN AREA CLINIC 1 | Facility: CLINIC | Age: 65
Setting detail: DERMATOLOGY
End: 2025-08-06

## 2025-08-06 DIAGNOSIS — L21.8 OTHER SEBORRHEIC DERMATITIS: ICD-10-CM | Status: INADEQUATELY CONTROLLED

## 2025-08-06 PROCEDURE — ? PRESCRIPTION MEDICATION MANAGEMENT

## 2025-08-06 PROCEDURE — ? COUNSELING

## 2025-08-06 PROCEDURE — ? TREATMENT GOALS

## 2025-08-06 PROCEDURE — ? PHOTO-DOCUMENTATION

## 2025-08-06 PROCEDURE — ? PRESCRIPTION

## 2025-08-06 PROCEDURE — ? DIAGNOSIS COMMENT

## 2025-08-06 RX ORDER — HALOBETASOL PROPIONATE 0.5 MG/G
OINTMENT TOPICAL
Qty: 50 | Refills: 7 | Status: ERX

## 2025-08-06 RX ORDER — MOMETASONE FUROATE 1 MG/G
OINTMENT TOPICAL
Qty: 45 | Refills: 2 | Status: ERX

## 2025-08-06 ASSESSMENT — LOCATION DETAILED DESCRIPTION DERM
LOCATION DETAILED: POSTERIOR MID-PARIETAL SCALP
LOCATION DETAILED: LEFT LATERAL SUPERIOR EYELID
LOCATION DETAILED: LEFT CENTRAL PARIETAL SCALP
LOCATION DETAILED: RIGHT CENTRAL FRONTAL SCALP
LOCATION DETAILED: RIGHT LATERAL SUPERIOR EYELID

## 2025-08-06 ASSESSMENT — LOCATION SIMPLE DESCRIPTION DERM
LOCATION SIMPLE: LEFT SUPERIOR EYELID
LOCATION SIMPLE: RIGHT SUPERIOR EYELID
LOCATION SIMPLE: POSTERIOR SCALP
LOCATION SIMPLE: SCALP

## 2025-08-06 ASSESSMENT — LOCATION ZONE DERM
LOCATION ZONE: SCALP
LOCATION ZONE: EYELID

## 2025-09-03 ENCOUNTER — OFFICE VISIT (OUTPATIENT)
Age: 65
End: 2025-09-03

## 2025-09-03 DIAGNOSIS — Z11.1 SCREENING-PULMONARY TB: Primary | ICD-10-CM
